# Patient Record
Sex: MALE | Race: WHITE | Employment: OTHER | URBAN - METROPOLITAN AREA
[De-identification: names, ages, dates, MRNs, and addresses within clinical notes are randomized per-mention and may not be internally consistent; named-entity substitution may affect disease eponyms.]

---

## 2017-07-31 PROBLEM — H25.011 CORTICAL AGE-RELATED CATARACT OF RIGHT EYE: Status: ACTIVE | Noted: 2017-07-31

## 2017-08-01 PROBLEM — H25.011 CORTICAL AGE-RELATED CATARACT OF RIGHT EYE: Status: RESOLVED | Noted: 2017-07-31 | Resolved: 2017-08-01

## 2017-08-18 PROBLEM — H25.012 CORTICAL AGE-RELATED CATARACT OF LEFT EYE: Status: ACTIVE | Noted: 2017-08-18

## 2017-08-21 PROBLEM — H25.012 CORTICAL AGE-RELATED CATARACT OF LEFT EYE: Status: RESOLVED | Noted: 2017-08-18 | Resolved: 2017-08-21

## 2019-01-19 PROBLEM — H26.492 POSTERIOR CAPSULAR OPACIFICATION VISUALLY SIGNIFICANT, LEFT EYE: Status: ACTIVE | Noted: 2019-01-19

## 2019-01-21 PROBLEM — H26.492 POSTERIOR CAPSULAR OPACIFICATION VISUALLY SIGNIFICANT, LEFT EYE: Status: RESOLVED | Noted: 2019-01-19 | Resolved: 2019-01-21

## 2019-03-26 ENCOUNTER — HOSPITAL ENCOUNTER (OUTPATIENT)
Dept: LAB | Age: 69
Discharge: HOME | End: 2019-03-26
Payer: MEDICARE

## 2019-03-26 ENCOUNTER — HOSPITAL ENCOUNTER (OUTPATIENT)
Dept: GENERAL RADIOLOGY | Age: 69
Discharge: HOME | End: 2019-03-26
Payer: MEDICARE

## 2019-03-26 ENCOUNTER — HOSPITAL ENCOUNTER (OUTPATIENT)
Dept: PREADMISSION TESTING | Age: 69
Discharge: HOME | End: 2019-03-26
Payer: MEDICARE

## 2019-03-26 DIAGNOSIS — M48.061 SPINAL STENOSIS, LUMBAR REGION, WITHOUT NEUROGENIC CLAUDICATION: ICD-10-CM

## 2019-03-26 LAB
ALBUMIN SERPL-MCNC: 3.9 G/DL (ref 3.4–5)
ALBUMIN/GLOB SERPL: 1.5 {RATIO} (ref 0.8–1.7)
ALP SERPL-CCNC: 117 U/L (ref 45–117)
ALT SERPL-CCNC: 28 U/L (ref 16–61)
ANION GAP SERPL CALC-SCNC: 5 MMOL/L (ref 3–18)
AST SERPL-CCNC: 24 U/L (ref 15–37)
ATRIAL RATE: 59 BPM
BILIRUB SERPL-MCNC: 0.9 MG/DL (ref 0.2–1)
BUN SERPL-MCNC: 20 MG/DL (ref 7–18)
BUN/CREAT SERPL: 17 (ref 12–20)
CALCIUM SERPL-MCNC: 8.3 MG/DL (ref 8.5–10.1)
CALCULATED P AXIS, ECG09: 64 DEGREES
CALCULATED R AXIS, ECG10: 15 DEGREES
CALCULATED T AXIS, ECG11: 50 DEGREES
CHLORIDE SERPL-SCNC: 108 MMOL/L (ref 100–108)
CO2 SERPL-SCNC: 28 MMOL/L (ref 21–32)
CREAT SERPL-MCNC: 1.18 MG/DL (ref 0.6–1.3)
DIAGNOSIS, 93000: NORMAL
ERYTHROCYTE [DISTWIDTH] IN BLOOD BY AUTOMATED COUNT: 13.1 % (ref 11.6–14.5)
GLOBULIN SER CALC-MCNC: 2.6 G/DL (ref 2–4)
GLUCOSE SERPL-MCNC: 93 MG/DL (ref 74–99)
HCT VFR BLD AUTO: 42 % (ref 36–48)
HGB BLD-MCNC: 14.1 G/DL (ref 13–16)
MCH RBC QN AUTO: 31.4 PG (ref 24–34)
MCHC RBC AUTO-ENTMCNC: 33.6 G/DL (ref 31–37)
MCV RBC AUTO: 93.5 FL (ref 74–97)
P-R INTERVAL, ECG05: 134 MS
PLATELET # BLD AUTO: 125 K/UL (ref 135–420)
PMV BLD AUTO: 10.6 FL (ref 9.2–11.8)
POTASSIUM SERPL-SCNC: 4.4 MMOL/L (ref 3.5–5.5)
PROT SERPL-MCNC: 6.5 G/DL (ref 6.4–8.2)
Q-T INTERVAL, ECG07: 416 MS
QRS DURATION, ECG06: 86 MS
QTC CALCULATION (BEZET), ECG08: 411 MS
RBC # BLD AUTO: 4.49 M/UL (ref 4.7–5.5)
SODIUM SERPL-SCNC: 141 MMOL/L (ref 136–145)
VENTRICULAR RATE, ECG03: 59 BPM
WBC # BLD AUTO: 5.9 K/UL (ref 4.6–13.2)

## 2019-03-26 PROCEDURE — 85027 COMPLETE CBC AUTOMATED: CPT

## 2019-03-26 PROCEDURE — 71046 X-RAY EXAM CHEST 2 VIEWS: CPT

## 2019-03-26 PROCEDURE — 93005 ELECTROCARDIOGRAM TRACING: CPT

## 2019-03-26 PROCEDURE — 36415 COLL VENOUS BLD VENIPUNCTURE: CPT

## 2019-03-26 PROCEDURE — 80053 COMPREHEN METABOLIC PANEL: CPT

## 2019-03-26 RX ORDER — NITROGLYCERIN 0.4 MG/1
0.4 TABLET SUBLINGUAL
COMMUNITY
Start: 2016-07-27 | End: 2021-07-12

## 2019-03-26 RX ORDER — GABAPENTIN 300 MG/1
300 CAPSULE ORAL 3 TIMES DAILY
COMMUNITY
Start: 2018-12-27 | End: 2021-04-20

## 2019-03-26 NOTE — PERIOP NOTES
PAT - SURGICAL PRE-ADMISSION INSTRUCTIONS    NAME:  Hugh Yan                                                          TODAY'S DATE:  3/26/2019    SURGERY DATE:  4/17/2019                                  SURGERY ARRIVAL TIME:   TBV on 03/16/2019 between hours of 3:00 and 5:00 pm    1. Do NOT eat or drink anything, including candy or gum, after MIDNIGHT on 04/16/2019 , unless you have specific instructions from your Surgeon or Anesthesia Provider to do so. 2. No smoking on the day of surgery. 3. No alcohol 24 hours prior to the day of surgery. 4. No recreational drugs for one week prior to the day of surgery. 5. Leave all valuables, including money/purse, at home. 6. Remove all jewelry, nail polish, makeup (including mascara); no lotions, powders, deodorant, or perfume/cologne/after shave. 7. Glasses/Contact lenses and Dentures may be worn to the hospital.  They will be removed prior to surgery. 8. Call your doctor if symptoms of a cold or illness develop within 24 ours prior to surgery. 9. AN ADULT MUST DRIVE YOU HOME AFTER OUTPATIENT SURGERY. 10. If you are having an OUTPATIENT procedure, please make arrangements for a responsible adult to be with you for 24 hours after your surgery. 11. If you are admitted to the hospital, you will be assigned to a bed after surgery is complete. Normally a family member will not be able to see you until you are in your assigned bed. 15. Family is encouraged to accompany you to the hospital.  We ask visitors in the treatment area to be limited to ONE person at a time to ensure patient privacy. EXCEPTIONS WILL BE MADE AS NEEDED. 15. Children under 12 are discouraged from entering the treatment area and need to be supervised by an adult when in the waiting room. Special Instructions:    STOP anticoagulants AT LEAST 1 WEEK PRIOR to your surgery or, follow other MD instructions:  No NSAIDS.   Patient states he was instructed to keep taking his Aspirin,    Patient Prep:    use CHG solution    These surgical instructions were reviewed with Carolina Campbell during the PAT visit. A printed copy of the instructions was provided to patient. Directions: On the morning of surgery, please go to the [facility]. Enter the building from the [facility] entrance, 1st floor (next to the Emergency Room entrance). Take the elevator to the 2nd floor. Sign in at the Registration Desk.     If you have any questions and/or concerns, please do not hesitate to call:  (Prior to the day of surgery)  hospitals unit:  [phone]  (Day of surgery)  [facility] unit:  [phone]

## 2019-03-28 NOTE — PROGRESS NOTES
Attended ortho pre-op teaching class. Patient made aware of Care-manager's role in the hospital with transition of care/discharge planning to include Home, Home with Home Health, SNF and any needed DME to include MD order, referral process, FOC, and insurance authorization if required. Patient acknowledged and understanding and had no questions.     Quinton Snell RN    Outcomes Manager  [phone]  [phone]

## 2019-04-16 ENCOUNTER — ANESTHESIA EVENT (OUTPATIENT)
Dept: SURGERY | Age: 69
DRG: 460 | End: 2019-04-16
Payer: MEDICARE

## 2019-04-17 ENCOUNTER — APPOINTMENT (OUTPATIENT)
Dept: GENERAL RADIOLOGY | Age: 69
DRG: 460 | End: 2019-04-17
Attending: ORTHOPAEDIC SURGERY
Payer: MEDICARE

## 2019-04-17 ENCOUNTER — ANESTHESIA (OUTPATIENT)
Dept: SURGERY | Age: 69
DRG: 460 | End: 2019-04-17
Payer: MEDICARE

## 2019-04-17 ENCOUNTER — HOSPITAL ENCOUNTER (INPATIENT)
Age: 69
LOS: 3 days | Discharge: HOME | DRG: 460 | End: 2019-04-20
Attending: ORTHOPAEDIC SURGERY | Admitting: ORTHOPAEDIC SURGERY
Payer: MEDICARE

## 2019-04-17 PROBLEM — M48.061 LUMBAR STENOSIS: Status: ACTIVE | Noted: 2019-04-17

## 2019-04-17 LAB
ABO + RH BLD: NORMAL
BLOOD GROUP ANTIBODIES SERPL: NORMAL
SPECIMEN EXP DATE BLD: NORMAL

## 2019-04-17 PROCEDURE — 77030034850: Performed by: ORTHOPAEDIC SURGERY

## 2019-04-17 PROCEDURE — 77030031139 HC SUT VCRL2 J&J -A: Performed by: ORTHOPAEDIC SURGERY

## 2019-04-17 PROCEDURE — 77030018673: Performed by: ORTHOPAEDIC SURGERY

## 2019-04-17 PROCEDURE — 74011250636 HC RX REV CODE- 250/636

## 2019-04-17 PROCEDURE — C1762 CONN TISS, HUMAN(INC FASCIA): HCPCS | Performed by: ORTHOPAEDIC SURGERY

## 2019-04-17 PROCEDURE — 76210000006 HC OR PH I REC 0.5 TO 1 HR: Performed by: ORTHOPAEDIC SURGERY

## 2019-04-17 PROCEDURE — 77030020269 HC MISC IMPL: Performed by: ORTHOPAEDIC SURGERY

## 2019-04-17 PROCEDURE — 74011250636 HC RX REV CODE- 250/636: Performed by: NURSE ANESTHETIST, CERTIFIED REGISTERED

## 2019-04-17 PROCEDURE — 74011000258 HC RX REV CODE- 258

## 2019-04-17 PROCEDURE — 77030008683 HC TU ET CUF COVD -A: Performed by: ANESTHESIOLOGY

## 2019-04-17 PROCEDURE — C1713 ANCHOR/SCREW BN/BN,TIS/BN: HCPCS | Performed by: ORTHOPAEDIC SURGERY

## 2019-04-17 PROCEDURE — 77030034696 HC CATH URETH FOL 2W BARD -A: Performed by: ORTHOPAEDIC SURGERY

## 2019-04-17 PROCEDURE — 77030040413: Performed by: ORTHOPAEDIC SURGERY

## 2019-04-17 PROCEDURE — 01NB0ZZ RELEASE LUMBAR NERVE, OPEN APPROACH: ICD-10-PCS | Performed by: ORTHOPAEDIC SURGERY

## 2019-04-17 PROCEDURE — 74011250637 HC RX REV CODE- 250/637: Performed by: NURSE ANESTHETIST, CERTIFIED REGISTERED

## 2019-04-17 PROCEDURE — 77030014647 HC SEAL FBRN TISSL BAXT -D: Performed by: ORTHOPAEDIC SURGERY

## 2019-04-17 PROCEDURE — 65270000029 HC RM PRIVATE

## 2019-04-17 PROCEDURE — 77030013079 HC BLNKT BAIR HGGR 3M -A: Performed by: ANESTHESIOLOGY

## 2019-04-17 PROCEDURE — 77030031359 HC BLD BN MILL DISP STRY -E: Performed by: ORTHOPAEDIC SURGERY

## 2019-04-17 PROCEDURE — 74011000272 HC RX REV CODE- 272: Performed by: ORTHOPAEDIC SURGERY

## 2019-04-17 PROCEDURE — 74011250636 HC RX REV CODE- 250/636: Performed by: ORTHOPAEDIC SURGERY

## 2019-04-17 PROCEDURE — 77030040356 HC CORD BPLR FRCP COVD -A: Performed by: ORTHOPAEDIC SURGERY

## 2019-04-17 PROCEDURE — 72100 X-RAY EXAM L-S SPINE 2/3 VWS: CPT

## 2019-04-17 PROCEDURE — 77030032490 HC SLV COMPR SCD KNE COVD -B: Performed by: ORTHOPAEDIC SURGERY

## 2019-04-17 PROCEDURE — 76010000178 HC OR TIME 5.5 TO 6 HR INTENSV-TIER 1: Performed by: ORTHOPAEDIC SURGERY

## 2019-04-17 PROCEDURE — 77030018723 HC ELCTRD BLD COVD -A: Performed by: ORTHOPAEDIC SURGERY

## 2019-04-17 PROCEDURE — 77030037723 HC GRFT BN SUB BGNX -F: Performed by: ORTHOPAEDIC SURGERY

## 2019-04-17 PROCEDURE — 77030004391 HC BUR FLUT MEDT -C: Performed by: ORTHOPAEDIC SURGERY

## 2019-04-17 PROCEDURE — 74011250637 HC RX REV CODE- 250/637: Performed by: ORTHOPAEDIC SURGERY

## 2019-04-17 PROCEDURE — 74011000250 HC RX REV CODE- 250

## 2019-04-17 PROCEDURE — 0SG30K1 FUSION OF LUMBOSACRAL JOINT WITH NONAUTOLOGOUS TISSUE SUBSTITUTE, POSTERIOR APPROACH, POSTERIOR COLUMN, OPEN APPROACH: ICD-10-PCS | Performed by: ORTHOPAEDIC SURGERY

## 2019-04-17 PROCEDURE — 77030018836 HC SOL IRR NACL ICUM -A: Performed by: ORTHOPAEDIC SURGERY

## 2019-04-17 PROCEDURE — 0QH204Z INSERTION OF INTERNAL FIXATION DEVICE INTO RIGHT PELVIC BONE, OPEN APPROACH: ICD-10-PCS | Performed by: ORTHOPAEDIC SURGERY

## 2019-04-17 PROCEDURE — 0SG10K1 FUSION OF 2 OR MORE LUMBAR VERTEBRAL JOINTS WITH NONAUTOLOGOUS TISSUE SUBSTITUTE, POSTERIOR APPROACH, POSTERIOR COLUMN, OPEN APPROACH: ICD-10-PCS | Performed by: ORTHOPAEDIC SURGERY

## 2019-04-17 PROCEDURE — 77030003028 HC SUT VCRL J&J -A: Performed by: ORTHOPAEDIC SURGERY

## 2019-04-17 PROCEDURE — 0QH304Z INSERTION OF INTERNAL FIXATION DEVICE INTO LEFT PELVIC BONE, OPEN APPROACH: ICD-10-PCS | Performed by: ORTHOPAEDIC SURGERY

## 2019-04-17 PROCEDURE — 77010033678 HC OXYGEN DAILY

## 2019-04-17 PROCEDURE — 86900 BLOOD TYPING SEROLOGIC ABO: CPT

## 2019-04-17 PROCEDURE — 74011250637 HC RX REV CODE- 250/637: Performed by: INTERNAL MEDICINE

## 2019-04-17 PROCEDURE — 77030008477 HC STYL SATN SLP COVD -A: Performed by: ANESTHESIOLOGY

## 2019-04-17 PROCEDURE — 76060000042 HC ANESTHESIA 5.5 TO 6 HR: Performed by: ORTHOPAEDIC SURGERY

## 2019-04-17 DEVICE — SCREW POLYAX ASMBLY 8.5MMX80MM: Type: IMPLANTABLE DEVICE | Site: BACK | Status: FUNCTIONAL

## 2019-04-17 DEVICE — IMPLANTABLE DEVICE: Type: IMPLANTABLE DEVICE | Site: BACK | Status: FUNCTIONAL

## 2019-04-17 DEVICE — SCREW SPNL L40MM OD6.5MM POLYAX ASMBLY CANAVERAL: Type: IMPLANTABLE DEVICE | Site: BACK | Status: FUNCTIONAL

## 2019-04-17 DEVICE — GRAFT BNE 6ML SUB OSTEOSPAN MOLD OPN BRL SYR MORPHEUS: Type: IMPLANTABLE DEVICE | Site: BACK | Status: FUNCTIONAL

## 2019-04-17 DEVICE — SCREW POLYAX ASMBLY 6.5MMX45MM: Type: IMPLANTABLE DEVICE | Site: BACK | Status: FUNCTIONAL

## 2019-04-17 DEVICE — SCREW SPNL DIA5.5MM OPN TULIP LOK RELINE: Type: IMPLANTABLE DEVICE | Site: BACK | Status: FUNCTIONAL

## 2019-04-17 DEVICE — SCREW SPNL L50MM OD65MM POLYAX ASSEMB CANAVERAL: Type: IMPLANTABLE DEVICE | Site: BACK | Status: FUNCTIONAL

## 2019-04-17 DEVICE — SCREW SPNL POST THORACOLUMBOSACRAL LCK CLOSE TULIP RELINE: Type: IMPLANTABLE DEVICE | Site: BACK | Status: FUNCTIONAL

## 2019-04-17 DEVICE — GRAFT BNE SUB 30CC 1 4MM CORT CANC CHIP STRATOFUSE: Type: IMPLANTABLE DEVICE | Site: BACK | Status: FUNCTIONAL

## 2019-04-17 RX ORDER — FENTANYL CITRATE 50 UG/ML
50 INJECTION, SOLUTION INTRAMUSCULAR; INTRAVENOUS AS NEEDED
Status: DISCONTINUED | OUTPATIENT
Start: 2019-04-17 | End: 2019-04-17 | Stop reason: HOSPADM

## 2019-04-17 RX ORDER — LIDOCAINE HYDROCHLORIDE 20 MG/ML
INJECTION, SOLUTION EPIDURAL; INFILTRATION; INTRACAUDAL; PERINEURAL AS NEEDED
Status: DISCONTINUED | OUTPATIENT
Start: 2019-04-17 | End: 2019-04-17 | Stop reason: HOSPADM

## 2019-04-17 RX ORDER — OXYCODONE AND ACETAMINOPHEN 10; 325 MG/1; MG/1
1-2 TABLET ORAL
Status: DISCONTINUED | OUTPATIENT
Start: 2019-04-17 | End: 2019-04-20 | Stop reason: HOSPADM

## 2019-04-17 RX ORDER — PANTOPRAZOLE SODIUM 20 MG/1
20 TABLET, DELAYED RELEASE ORAL
Status: DISCONTINUED | OUTPATIENT
Start: 2019-04-18 | End: 2019-04-20 | Stop reason: HOSPADM

## 2019-04-17 RX ORDER — HYDROCODONE BITARTRATE AND ACETAMINOPHEN 5; 325 MG/1; MG/1
1 TABLET ORAL ONCE
Status: DISCONTINUED | OUTPATIENT
Start: 2019-04-17 | End: 2019-04-17 | Stop reason: HOSPADM

## 2019-04-17 RX ORDER — PROPOFOL 10 MG/ML
INJECTION, EMULSION INTRAVENOUS AS NEEDED
Status: DISCONTINUED | OUTPATIENT
Start: 2019-04-17 | End: 2019-04-17 | Stop reason: HOSPADM

## 2019-04-17 RX ORDER — FAMOTIDINE 20 MG/1
20 TABLET, FILM COATED ORAL ONCE
Status: COMPLETED | OUTPATIENT
Start: 2019-04-17 | End: 2019-04-17

## 2019-04-17 RX ORDER — TRAMADOL HYDROCHLORIDE 50 MG/1
50 TABLET ORAL
Status: DISCONTINUED | OUTPATIENT
Start: 2019-04-17 | End: 2019-04-17

## 2019-04-17 RX ORDER — FENTANYL CITRATE 50 UG/ML
INJECTION, SOLUTION INTRAMUSCULAR; INTRAVENOUS AS NEEDED
Status: DISCONTINUED | OUTPATIENT
Start: 2019-04-17 | End: 2019-04-17 | Stop reason: HOSPADM

## 2019-04-17 RX ORDER — GLYCOPYRROLATE 0.2 MG/ML
INJECTION INTRAMUSCULAR; INTRAVENOUS AS NEEDED
Status: DISCONTINUED | OUTPATIENT
Start: 2019-04-17 | End: 2019-04-17 | Stop reason: HOSPADM

## 2019-04-17 RX ORDER — ADHESIVE BANDAGE
30 BANDAGE TOPICAL DAILY PRN
Status: DISCONTINUED | OUTPATIENT
Start: 2019-04-17 | End: 2019-04-20 | Stop reason: HOSPADM

## 2019-04-17 RX ORDER — CEFAZOLIN SODIUM 2 G/50ML
2 SOLUTION INTRAVENOUS
Status: COMPLETED | OUTPATIENT
Start: 2019-04-17 | End: 2019-04-17

## 2019-04-17 RX ORDER — SODIUM CHLORIDE 0.9 % (FLUSH) 0.9 %
5-40 SYRINGE (ML) INJECTION EVERY 8 HOURS
Status: DISCONTINUED | OUTPATIENT
Start: 2019-04-17 | End: 2019-04-20 | Stop reason: HOSPADM

## 2019-04-17 RX ORDER — ACETAMINOPHEN 325 MG/1
650 TABLET ORAL 2 TIMES DAILY
Status: DISCONTINUED | OUTPATIENT
Start: 2019-04-17 | End: 2019-04-20 | Stop reason: HOSPADM

## 2019-04-17 RX ORDER — GUAIFENESIN 100 MG/5ML
162 LIQUID (ML) ORAL DAILY
Status: DISCONTINUED | OUTPATIENT
Start: 2019-04-18 | End: 2019-04-20 | Stop reason: HOSPADM

## 2019-04-17 RX ORDER — MIDAZOLAM HYDROCHLORIDE 1 MG/ML
INJECTION, SOLUTION INTRAMUSCULAR; INTRAVENOUS AS NEEDED
Status: DISCONTINUED | OUTPATIENT
Start: 2019-04-17 | End: 2019-04-17 | Stop reason: HOSPADM

## 2019-04-17 RX ORDER — HYDROMORPHONE HYDROCHLORIDE 1 MG/ML
2 INJECTION, SOLUTION INTRAMUSCULAR; INTRAVENOUS; SUBCUTANEOUS
Status: DISCONTINUED | OUTPATIENT
Start: 2019-04-17 | End: 2019-04-17

## 2019-04-17 RX ORDER — ATENOLOL 25 MG/1
12.5 TABLET ORAL DAILY
Status: DISCONTINUED | OUTPATIENT
Start: 2019-04-18 | End: 2019-04-20 | Stop reason: HOSPADM

## 2019-04-17 RX ORDER — HYDROMORPHONE HYDROCHLORIDE 2 MG/ML
1 INJECTION, SOLUTION INTRAMUSCULAR; INTRAVENOUS; SUBCUTANEOUS
Status: DISCONTINUED | OUTPATIENT
Start: 2019-04-17 | End: 2019-04-17 | Stop reason: SDUPTHER

## 2019-04-17 RX ORDER — SODIUM CHLORIDE, SODIUM LACTATE, POTASSIUM CHLORIDE, CALCIUM CHLORIDE 600; 310; 30; 20 MG/100ML; MG/100ML; MG/100ML; MG/100ML
25 INJECTION, SOLUTION INTRAVENOUS CONTINUOUS
Status: DISPENSED | OUTPATIENT
Start: 2019-04-17 | End: 2019-04-18

## 2019-04-17 RX ORDER — VANCOMYCIN HYDROCHLORIDE 1 G/20ML
INJECTION, POWDER, LYOPHILIZED, FOR SOLUTION INTRAVENOUS AS NEEDED
Status: DISCONTINUED | OUTPATIENT
Start: 2019-04-17 | End: 2019-04-17 | Stop reason: HOSPADM

## 2019-04-17 RX ORDER — CEFAZOLIN SODIUM 2 G/50ML
2 SOLUTION INTRAVENOUS EVERY 8 HOURS
Status: COMPLETED | OUTPATIENT
Start: 2019-04-17 | End: 2019-04-18

## 2019-04-17 RX ORDER — ONDANSETRON 2 MG/ML
INJECTION INTRAMUSCULAR; INTRAVENOUS AS NEEDED
Status: DISCONTINUED | OUTPATIENT
Start: 2019-04-17 | End: 2019-04-17 | Stop reason: HOSPADM

## 2019-04-17 RX ORDER — DEXAMETHASONE SODIUM PHOSPHATE 4 MG/ML
INJECTION, SOLUTION INTRA-ARTICULAR; INTRALESIONAL; INTRAMUSCULAR; INTRAVENOUS; SOFT TISSUE AS NEEDED
Status: DISCONTINUED | OUTPATIENT
Start: 2019-04-17 | End: 2019-04-17 | Stop reason: HOSPADM

## 2019-04-17 RX ORDER — AMLODIPINE BESYLATE 2.5 MG/1
2.5 TABLET ORAL DAILY
Status: DISCONTINUED | OUTPATIENT
Start: 2019-04-18 | End: 2019-04-20 | Stop reason: HOSPADM

## 2019-04-17 RX ORDER — DEXTROSE, SODIUM CHLORIDE, AND POTASSIUM CHLORIDE 5; .45; .15 G/100ML; G/100ML; G/100ML
75 INJECTION INTRAVENOUS CONTINUOUS
Status: DISCONTINUED | OUTPATIENT
Start: 2019-04-17 | End: 2019-04-19

## 2019-04-17 RX ORDER — VECURONIUM BROMIDE FOR INJECTION 1 MG/ML
INJECTION, POWDER, LYOPHILIZED, FOR SOLUTION INTRAVENOUS AS NEEDED
Status: DISCONTINUED | OUTPATIENT
Start: 2019-04-17 | End: 2019-04-17 | Stop reason: HOSPADM

## 2019-04-17 RX ORDER — GABAPENTIN 300 MG/1
300 CAPSULE ORAL 2 TIMES DAILY
Status: DISCONTINUED | OUTPATIENT
Start: 2019-04-17 | End: 2019-04-20 | Stop reason: HOSPADM

## 2019-04-17 RX ORDER — DIPHENHYDRAMINE HYDROCHLORIDE 50 MG/ML
25 INJECTION, SOLUTION INTRAMUSCULAR; INTRAVENOUS
Status: DISCONTINUED | OUTPATIENT
Start: 2019-04-17 | End: 2019-04-17 | Stop reason: HOSPADM

## 2019-04-17 RX ORDER — SODIUM CHLORIDE, SODIUM LACTATE, POTASSIUM CHLORIDE, CALCIUM CHLORIDE 600; 310; 30; 20 MG/100ML; MG/100ML; MG/100ML; MG/100ML
75 INJECTION, SOLUTION INTRAVENOUS CONTINUOUS
Status: DISCONTINUED | OUTPATIENT
Start: 2019-04-17 | End: 2019-04-17 | Stop reason: HOSPADM

## 2019-04-17 RX ORDER — HYDROMORPHONE HYDROCHLORIDE 1 MG/ML
1-2 INJECTION, SOLUTION INTRAMUSCULAR; INTRAVENOUS; SUBCUTANEOUS
Status: DISCONTINUED | OUTPATIENT
Start: 2019-04-17 | End: 2019-04-20 | Stop reason: HOSPADM

## 2019-04-17 RX ORDER — HYDROMORPHONE HYDROCHLORIDE 2 MG/ML
0.5 INJECTION, SOLUTION INTRAMUSCULAR; INTRAVENOUS; SUBCUTANEOUS
Status: DISCONTINUED | OUTPATIENT
Start: 2019-04-17 | End: 2019-04-17 | Stop reason: HOSPADM

## 2019-04-17 RX ORDER — HYDROMORPHONE HYDROCHLORIDE 1 MG/ML
1 INJECTION, SOLUTION INTRAMUSCULAR; INTRAVENOUS; SUBCUTANEOUS
Status: DISCONTINUED | OUTPATIENT
Start: 2019-04-17 | End: 2019-04-17

## 2019-04-17 RX ORDER — LEVOTHYROXINE SODIUM 25 UG/1
25 TABLET ORAL
Status: DISCONTINUED | OUTPATIENT
Start: 2019-04-18 | End: 2019-04-20 | Stop reason: HOSPADM

## 2019-04-17 RX ORDER — DIPHENHYDRAMINE HYDROCHLORIDE 50 MG/ML
25 INJECTION, SOLUTION INTRAMUSCULAR; INTRAVENOUS
Status: DISCONTINUED | OUTPATIENT
Start: 2019-04-17 | End: 2019-04-20 | Stop reason: HOSPADM

## 2019-04-17 RX ORDER — ATORVASTATIN CALCIUM 40 MG/1
40 TABLET, FILM COATED ORAL EVERY EVENING
Status: DISCONTINUED | OUTPATIENT
Start: 2019-04-17 | End: 2019-04-20 | Stop reason: HOSPADM

## 2019-04-17 RX ORDER — OXYCODONE AND ACETAMINOPHEN 5; 325 MG/1; MG/1
1-2 TABLET ORAL
Status: DISCONTINUED | OUTPATIENT
Start: 2019-04-17 | End: 2019-04-17

## 2019-04-17 RX ORDER — EPHEDRINE SULFATE 50 MG/ML
INJECTION, SOLUTION INTRAVENOUS AS NEEDED
Status: DISCONTINUED | OUTPATIENT
Start: 2019-04-17 | End: 2019-04-17 | Stop reason: HOSPADM

## 2019-04-17 RX ORDER — HYDROMORPHONE HYDROCHLORIDE 1 MG/ML
INJECTION, SOLUTION INTRAMUSCULAR; INTRAVENOUS; SUBCUTANEOUS AS NEEDED
Status: DISCONTINUED | OUTPATIENT
Start: 2019-04-17 | End: 2019-04-17 | Stop reason: HOSPADM

## 2019-04-17 RX ORDER — ONDANSETRON 2 MG/ML
4 INJECTION INTRAMUSCULAR; INTRAVENOUS
Status: DISCONTINUED | OUTPATIENT
Start: 2019-04-17 | End: 2019-04-20 | Stop reason: HOSPADM

## 2019-04-17 RX ORDER — SODIUM CHLORIDE 0.9 % (FLUSH) 0.9 %
5-40 SYRINGE (ML) INJECTION AS NEEDED
Status: DISCONTINUED | OUTPATIENT
Start: 2019-04-17 | End: 2019-04-20 | Stop reason: HOSPADM

## 2019-04-17 RX ADMIN — SODIUM CHLORIDE, SODIUM LACTATE, POTASSIUM CHLORIDE, AND CALCIUM CHLORIDE: 600; 310; 30; 20 INJECTION, SOLUTION INTRAVENOUS at 12:41

## 2019-04-17 RX ADMIN — VECURONIUM BROMIDE FOR INJECTION 2 MG: 1 INJECTION, POWDER, LYOPHILIZED, FOR SOLUTION INTRAVENOUS at 09:36

## 2019-04-17 RX ADMIN — VECURONIUM BROMIDE FOR INJECTION 2 MG: 1 INJECTION, POWDER, LYOPHILIZED, FOR SOLUTION INTRAVENOUS at 11:53

## 2019-04-17 RX ADMIN — VECURONIUM BROMIDE FOR INJECTION 8 MG: 1 INJECTION, POWDER, LYOPHILIZED, FOR SOLUTION INTRAVENOUS at 07:45

## 2019-04-17 RX ADMIN — GABAPENTIN 300 MG: 300 CAPSULE ORAL at 18:06

## 2019-04-17 RX ADMIN — OXYCODONE AND ACETAMINOPHEN 1 TABLET: 10; 325 TABLET ORAL at 21:04

## 2019-04-17 RX ADMIN — OXYCODONE AND ACETAMINOPHEN 1 TABLET: 10; 325 TABLET ORAL at 16:40

## 2019-04-17 RX ADMIN — FENTANYL CITRATE 50 MCG: 50 INJECTION, SOLUTION INTRAMUSCULAR; INTRAVENOUS at 13:35

## 2019-04-17 RX ADMIN — CEFAZOLIN SODIUM 2 G: 2 SOLUTION INTRAVENOUS at 08:17

## 2019-04-17 RX ADMIN — FAMOTIDINE 20 MG: 20 TABLET ORAL at 06:42

## 2019-04-17 RX ADMIN — VECURONIUM BROMIDE FOR INJECTION 2 MG: 1 INJECTION, POWDER, LYOPHILIZED, FOR SOLUTION INTRAVENOUS at 08:36

## 2019-04-17 RX ADMIN — GLYCOPYRROLATE 0.2 MG: 0.2 INJECTION INTRAMUSCULAR; INTRAVENOUS at 09:03

## 2019-04-17 RX ADMIN — DEXAMETHASONE SODIUM PHOSPHATE 8 MG: 4 INJECTION, SOLUTION INTRA-ARTICULAR; INTRALESIONAL; INTRAMUSCULAR; INTRAVENOUS; SOFT TISSUE at 07:52

## 2019-04-17 RX ADMIN — Medication 10 ML: at 22:00

## 2019-04-17 RX ADMIN — CEFAZOLIN 2 G: 10 INJECTION, POWDER, FOR SOLUTION INTRAVENOUS at 21:04

## 2019-04-17 RX ADMIN — PROPOFOL 80 MG: 10 INJECTION, EMULSION INTRAVENOUS at 08:35

## 2019-04-17 RX ADMIN — SODIUM CHLORIDE, SODIUM LACTATE, POTASSIUM CHLORIDE, AND CALCIUM CHLORIDE 25 ML/HR: 600; 310; 30; 20 INJECTION, SOLUTION INTRAVENOUS at 07:11

## 2019-04-17 RX ADMIN — FENTANYL CITRATE 100 MCG: 50 INJECTION, SOLUTION INTRAMUSCULAR; INTRAVENOUS at 07:45

## 2019-04-17 RX ADMIN — CEFAZOLIN SODIUM 2 G: 2 SOLUTION INTRAVENOUS at 11:59

## 2019-04-17 RX ADMIN — MIDAZOLAM HYDROCHLORIDE 2 MG: 1 INJECTION, SOLUTION INTRAMUSCULAR; INTRAVENOUS at 07:38

## 2019-04-17 RX ADMIN — VECURONIUM BROMIDE FOR INJECTION 2 MG: 1 INJECTION, POWDER, LYOPHILIZED, FOR SOLUTION INTRAVENOUS at 10:50

## 2019-04-17 RX ADMIN — FENTANYL CITRATE 100 MCG: 50 INJECTION, SOLUTION INTRAMUSCULAR; INTRAVENOUS at 08:35

## 2019-04-17 RX ADMIN — LIDOCAINE HYDROCHLORIDE 100 MG: 20 INJECTION, SOLUTION EPIDURAL; INFILTRATION; INTRACAUDAL; PERINEURAL at 07:45

## 2019-04-17 RX ADMIN — ACETAMINOPHEN 650 MG: 325 TABLET, FILM COATED ORAL at 18:05

## 2019-04-17 RX ADMIN — ATORVASTATIN CALCIUM 40 MG: 40 TABLET, FILM COATED ORAL at 18:06

## 2019-04-17 RX ADMIN — FENTANYL CITRATE 50 MCG: 50 INJECTION, SOLUTION INTRAMUSCULAR; INTRAVENOUS at 13:45

## 2019-04-17 RX ADMIN — DEXTROSE MONOHYDRATE, SODIUM CHLORIDE, AND POTASSIUM CHLORIDE 125 ML/HR: 50; 4.5; 1.49 INJECTION, SOLUTION INTRAVENOUS at 21:05

## 2019-04-17 RX ADMIN — SODIUM CHLORIDE, SODIUM LACTATE, POTASSIUM CHLORIDE, AND CALCIUM CHLORIDE: 600; 310; 30; 20 INJECTION, SOLUTION INTRAVENOUS at 08:59

## 2019-04-17 RX ADMIN — DEXTROSE MONOHYDRATE, SODIUM CHLORIDE, AND POTASSIUM CHLORIDE 125 ML/HR: 50; 4.5; 1.49 INJECTION, SOLUTION INTRAVENOUS at 14:41

## 2019-04-17 RX ADMIN — GLYCOPYRROLATE 0.2 MG: 0.2 INJECTION INTRAMUSCULAR; INTRAVENOUS at 09:04

## 2019-04-17 RX ADMIN — PROPOFOL 120 MG: 10 INJECTION, EMULSION INTRAVENOUS at 07:45

## 2019-04-17 RX ADMIN — EPHEDRINE SULFATE 10 MG: 50 INJECTION, SOLUTION INTRAVENOUS at 09:06

## 2019-04-17 RX ADMIN — ONDANSETRON 4 MG: 2 INJECTION INTRAMUSCULAR; INTRAVENOUS at 12:38

## 2019-04-17 RX ADMIN — HYDROMORPHONE HYDROCHLORIDE 1 MG: 1 INJECTION, SOLUTION INTRAMUSCULAR; INTRAVENOUS; SUBCUTANEOUS at 12:04

## 2019-04-17 RX ADMIN — HYDROMORPHONE HYDROCHLORIDE 1 MG: 1 INJECTION, SOLUTION INTRAMUSCULAR; INTRAVENOUS; SUBCUTANEOUS at 14:57

## 2019-04-17 NOTE — PROGRESS NOTES
conducted an initial consultation and Spiritual Assessment for Juan Manuel Burroughs, who is a 77 y.o.,male. Patients Primary Language is: [place]. According to the patients EMR Zoroastrianism Affiliation is: No preference. The reason the Patient came to the hospital is:   Patient Active Problem List    Diagnosis Date Noted    Lumbar stenosis 04/17/2019    Acute transmural inferior wall MI ([address] 05/04/2014    HTN (hypertension) 05/04/2014        The  provided the following Interventions:  Initiated a relationship of care and support. Provided information about Spiritual Care Services. Offered prayer and assurance of continued prayers on patient's behalf. The following outcomes were achieved:  Patient expressed gratitude for 's visit. Assessment:  There are no further spiritual or Christianity issues which require intervention at this time. Plan:  Chaplains will continue to follow and will provide pastoral care on an as needed/requested basis. Miri Purcell M.Div.   , [facility]: [phone]/CK[facility]: [phone]

## 2019-04-17 NOTE — PROGRESS NOTES
1530- Received verbal report from [facility]. Pt clear on 2L O2 NC. AOx4, Dressing to back CDI. 1- Dr. Calos Desir rounding on pt. Received verbal order for Percocet 10-325mg 1-2 tabs PRN Q4.     1550- Dr. Edie Bazan rounding on pt. Received verbal order for Dilaudid 1-2mg IV Q3 PRN. 2030- Bedside and Verbal shift change report given to Lily Myrick RN (oncoming nurse) by Zuleyma Craven RN (offgoing nurse). Report included the following information SBAR, Kardex and MAR.

## 2019-04-17 NOTE — PERIOP NOTES
patient transferred to 2219 in good condition wife updated on status Dr Mounika Reid also at bedside evaluating patient

## 2019-04-17 NOTE — PROGRESS NOTES
@ 7667  Ortho round complete with coordinator at bedside. Resting comfortably in bed in no acute distress. Plan of care discussed, all questions answered. Safety measures remain in place, call bell in reach.

## 2019-04-17 NOTE — PROGRESS NOTES
Pacheco Wells has decided with their surgeon to have a spine surgery to improve mobility and decrease pain. Spine Surgery Pre-Operative class was attended 03/28/2019. Topics discussed included surgery preparation, what to expect the day of surgery, medications (to include a multimodal approach to pain control and limiting narcotics), nutrition, glycemic control, respiratory therapy, physical and occupational therapy, and discharge planning. Discussed the importance of using these alternative pain management methods with the goal of using less opioid use after surgery and at home. A patient education notebook was provided and the opportunity was given to ask questions. The phone number of the Orthopaedic  was provided for any future questions or concerns.

## 2019-04-17 NOTE — ROUTINE PROCESS
TRANSFER - IN REPORT:    Verbal report received from graham(name) on [place]  being received from pacu(unit) for routine post - op      Report consisted of patients Situation, Background, Assessment and   Recommendations(SBAR). Information from the following report(s) SBAR was reviewed with the receiving nurse. Opportunity for questions and clarification was provided.

## 2019-04-17 NOTE — OP NOTES
BRIEF OPERATIVE NOTE    Date of Procedure: 4/17/2019     Preoperative Diagnosis: stenosis  m48.061    Postoperative Diagnosis: stenosis  m48.061      Procedure: Procedure(s):  decompression left l3-s1, flospine, posterolateral spine fusion l2-s1, iliac bolts, possible lumbar jumper rods    Surgeon(s) and Role: Melly Israel MD - Primary    Anesthesia: General     Findings: degeneration L2-3 with retrolisthesis L2-3,  Stenosis Left L3-s1     Estimated Blood Loss: 350  [id number]      [id number]        [id number]    Specimens: * No specimens in log *     Tubes/Drains: None    Needle/sponge count:  Correct    Complications: 0  In rr pt notes decreased l lep.   Quad, at,gs intact Sherrie Dearth Dr Alline Claude to see  Up at mahsa  PT ambulate with tlso  Home 2-3 days with tlso and ultram  rto 1 month

## 2019-04-17 NOTE — PERIOP NOTES
Pre-Op Summary    Pt arrived via car with family/friend and is oriented to time, place, person and situation. Patient with steady gait with none assistive devices. Visit Vitals  /89 (BP 1 Location: Right arm, BP Patient Position: At rest)   Pulse (!) 58   Temp 96.9 °F (36.1 °C)   Resp 16   Wt 70.8 kg (156 lb)   SpO2 98%   BMI 25.18 kg/m²       Peripheral IV located on Right hand . Patients belongings are located given to wife. Patient's point of contact is Deborajosephine Barroso and their contact number is: [phone]. They will be in the waiting room. They are able to receive medication information. They will be admitted.

## 2019-04-17 NOTE — PERIOP NOTES
TRANSFER - OUT REPORT:    Verbal report given to juan batres(name) on Ambrocio Yang  being transferred to 2219(unit) for routine post - op       Report consisted of patients Situation, Background, Assessment and   Recommendations(SBAR). Information from the following report(s) SBAR, OR Summary, Intake/Output and MAR was reviewed with the receiving nurse. Lines:   Peripheral IV 04/17/19 Right Hand (Active)   Site Assessment Clean, dry, & intact 4/17/2019  7:09 AM   Phlebitis Assessment 0 4/17/2019  7:09 AM   Infiltration Assessment 0 4/17/2019  7:09 AM   Dressing Status Clean, dry, & intact 4/17/2019  7:09 AM   Dressing Type Transparent;Tape 4/17/2019  7:09 AM   Hub Color/Line Status Pink; Infusing 4/17/2019  7:09 AM   Alcohol Cap Used Yes 4/17/2019  7:09 AM        Opportunity for questions and clarification was provided.       Patient transported with:   Registered Nurse

## 2019-04-17 NOTE — CONSULTS
Reason for consultation:    Monitoring and management of  acute and chronic medical problems     Postoperative Diagnosis: stenosis  m48.061       Procedure: Procedure(s):  decompression left L3-S1, flospine, posterolateral spine fusion L2-S1          HPI:Pleasant gentleman post uneventful L/S  decompression and fusion as outlined above. C/O post op pain; no LE numbness or weakness. No CP SOB  No N/V  Zavala in place [address] [facility] PROBLEMS/PMH/PSH    Patient Active Problem List   Diagnosis Code    Acute transmural inferior wall MI ([facility] [address] I21.19    HTN (hypertension) I10    Lumbar stenosis M48.061       PMH:  Past Medical History:   Diagnosis Date    CAD (coronary artery disease)     Cortical age-related cataract of left eye 8/18/2017    Cortical age-related cataract of right eye 7/31/2017    GERD (gastroesophageal reflux disease)     Hypercholesteremia     Hypertension     Posterior capsular opacification visually significant, left eye 1/19/2019    Thyroid disease        PSH:  Past Surgical History:   Procedure Laterality Date    CARDIAC SURG PROCEDURE UNLIST      2 stents May 2014    HX CATARACT REMOVAL Right 08/01/2017    HX CATARACT REMOVAL Left     HX KNEE ARTHROSCOPY Left     HX ORTHOPAEDIC Right     knee replacement    HX SHOULDER ARTHROSCOPY Left     2/2016      ARTHROSCOPY KNEE 15 YEARS AGO    BACK SURGERY 2010   lower back    CORONARY STENT PLACEMENT   2 stents in OM with 50% LAD disease    ENDO, EGD WITH ESOPHAGEAL DILATATION   X2--LAST DILATATION 2005    KNEE REPLACEMENT JAN. 2009   RIGHT TKR    KNEE REPLACEMENT Left 1/4/2018   Procedure: ARTHROPLASTY KNEE REPLACEMENT; Surgeon: Mahi Mazariegos MD    SHOULDER REPAIR Right 2009     PTA MEDICATIONS  Medications Prior to Admission   Medication Sig    gabapentin (NEURONTIN) 300 mg capsule 300 mg three (3) times daily.  levothyroxine (SYNTHROID) 50 mcg tablet Take  by mouth Daily (before breakfast).     amLODIPine (NORVASC) 2.5 mg tablet Take 2.5 mg by mouth daily.  atenolol (TENORMIN) 25 mg tablet Take 1 Tab by mouth daily (with dinner). (Patient taking differently: Take 12.5 mg by mouth daily (with dinner). )    aspirin 81 mg chewable tablet Take 2 Tabs by mouth daily.  atorvastatin (LIPITOR) 40 mg tablet Take 1 Tab by mouth every evening.  omeprazole (PRILOSEC OTC) 20 mg tablet Take 20 mg by mouth Daily (before dinner).  multivitamin (ONE A DAY) tablet Take 1 Tab by mouth daily (with dinner).  nitroglycerin (NITROSTAT) 0.4 mg SL tablet 0.4 mg by SubLINGual route. ALLERGIES:  Not allergic to hydrocodone or  Oxycodone- decrease  Appetite  Discussed with patient agrees to take percocet     Allergies   Allergen Reactions    Hydrocodone Other (comments)     Weakness, GI issues    Oxycodone Other (comments)     GI intolerance and fatigue    Sulfa (Sulfonamide Antibiotics) Rash        Family History   Problem Relation Age of Onset    Heart Disease Mother        Social History     Socioeconomic History    Marital status:      Spouse name: Not on file    Number of children: Not on file    Years of education: Not on file    Highest education level: Not on file   Occupational History    Not on file   Social Needs    Financial resource strain: Not on file    Food insecurity:     Worry: Not on file     Inability: Not on file    Transportation needs:     Medical: Not on file     Non-medical: Not on file   Tobacco Use    Smoking status: Former Smoker     Last attempt to quit: 3/26/1994     Years since quittin.0    Smokeless tobacco: Never Used   Substance and Sexual Activity    Alcohol use:  Yes     Alcohol/week: 7.0 oz     Types: 14 Shots of liquor per week     Comment: socially    Drug use: No    Sexual activity: Not on file   Lifestyle    Physical activity:     Days per week: Not on file     Minutes per session: Not on file    Stress: Not on file   Relationships    Social connections:     Talks on phone: Not on file     Gets together: Not on file     Attends Yazdanism service: Not on file     Active member of club or organization: Not on file     Attends meetings of clubs or organizations: Not on file     Relationship status: Not on file    Intimate partner violence:     Fear of current or ex partner: Not on file     Emotionally abused: Not on file     Physically abused: Not on file     Forced sexual activity: Not on file   Other Topics Concern    Not on file   Social History Narrative    Not on file        ROS:  Constitutional:  . No headache. Eyes: No visual changes  Ears:  No hearing loss. Nose: No bleed. No congestion  Neck: No pain. Cardiac: No CP. SIERRA. No orthopnea. No PND. No leg edema   Respiratory: No cough . No wheezing . No hemoptysis  GI: No nausea . No vomiting. No reflux. No dysphagia. No abdominal pain. Normal BM . No black stool. No blood in the stool  : No dysuria, urgency or frequency. Nocturia 1/night. No sense of incomplete emptying  M/S: + back pain  Neuro:No LE weakness or numbness  Skin: No rash. No itching  HEM/LYMPH:  No bruising. Endocrine: No polydipsia. Psych: No anxiety . No depression. No sleep difficulty       Physical Exam:      Visit Vitals  /80   Pulse 74   Temp 97.5 °F (36.4 °C)   Resp 16   Wt 70.8 kg (156 lb)   SpO2 90%   BMI 25.18 kg/m²     GENERAL: WN NAD  HEENT:    Face:Symmetrical  CONJUNCTIVA: Pink. SCLERA: Anicteric    ORAL MUCOSA: Moist. No lesions  TONGUE: Moist. No lesions  TEETH: Good dentition. No Extractions . No broken or loose tooth    GUMS: No bleeding or swelling  HARD SOFT/ PALATE/TONSILS: No Swelling. No ulcers. No Masses  OROPHARYNX: WNL  NECK: No JVD. No masses. No enlarged-tender lymph nodes.  Trachea in mid line.  THYROID: Size normal. No thyroid nodules    CAROTID ARTERIES: Pulsation 2+ bilaterally. No bruits  LUNGS: CTA. BS Normal Bilaterally  HEART: RRR   Normal S1 S2. No Significant murmur.  No S3 S4  ABDOMEN: Soft. Normal BS. No tenderness. No HSM /SMG. No palpable enlarged aorta. No bruits. LE: No edema. SCD in place  PULSES: Radial 2+; Femoral 2+; Posterior Tibialis 2+  SKIN:No rash. No ecchymosis. No petechiae    MUSCULOSKELETAL:      Adequate ROM in all extremeties    NEUROLOGIC:     Motor  RUE: 5/5  LUE: 5/5  RLE: AT GS 5/5  LLE: At GS 5/5     Reflexes:  Not testesd     PSYCHIATRIC    Mood: normal  Affect: appropriate                   CARDIAC CATHETERIZATION 06/03/14  IMPRESSION:  1. Coronaries:  Moderate non-obstructive calcific coronary artery   disease of major epicardial vessels, with distal vessel disease   of small inferior branch of obtuse marginal 1 and small inferior   branch of diagonal 2. 2/2 stent sites are widely patent  2. Left ventricle:  Normal function, ejection fraction 60%  3. No mitral regurgitation or aortic stenosis     ECHO 05/02/18  NORMAL LEFT VENTRICULAR CAVITY SIZE AND SYSTOLIC FUNCTION WITH A CALCULATED EJECTION  FRACTION OF 55%. STAGE I DIASTOLIC DYSFUNCTION. MILD BASAL SEPTAL HYPERTROPHY WITH NO OUTFLOW TRACT OBSTRUCTION. LEFT ATRIAL DILATATION. THE RIGHT VENTRICLE IS NORMAL IN SIZE AND FUNCTION. NORMAL PULMONARY ARTERY PRESSURE OF 26 MMHG. NO HEMODYNAMICALLY SIGNIFICANT VALVULAR PATHOLOGY.     Labs Reviewed:    Recent Results (from the past 24 hour(s))   TYPE & SCREEN    Collection Time: 04/17/19  7:00 AM   Result Value Ref Range    Crossmatch Expiration 04/20/2019     ABO/Rh(D) B POSITIVE     Antibody screen NEG      Imaging Reviewed:    ASSESSMENT  Post L/S surgery stable  Post op Pain  HTN   CAD   Preserved LVEF  Mild diastolic dysfunction    PATIENT AGREES TO TAKE PERCOCET    PLAN  Resume PTA meds as condition allows  Percocet  D/C Zavala in AM        Lawrence Montanez MD  4/17/2019, 2:37 PM

## 2019-04-17 NOTE — ANESTHESIA PREPROCEDURE EVALUATION
Relevant Problems No relevant active problems Anesthetic History No history of anesthetic complications Review of Systems / Medical History Patient summary reviewed and pertinent labs reviewed Pulmonary Within defined limits Neuro/Psych Within defined limits Cardiovascular Hypertension CAD and cardiac stents Exercise tolerance: >4 METS 
  
GI/Hepatic/Renal 
Within defined limits Endo/Other Hypothyroidism: well controlled Other Findings Comments:  
 
 
  
 
 
 
 
Physical Exam 
 
Airway Mallampati: II 
TM Distance: 4 - 6 cm Neck ROM: normal range of motion Mouth opening: Normal 
 
 Cardiovascular Regular rate and rhythm,  S1 and S2 normal,  no murmur, click, rub, or gallop Rhythm: regular Rate: normal 
 
 
 
 Dental 
No notable dental hx Pulmonary Breath sounds clear to auscultation Abdominal 
GI exam deferred Other Findings Anesthetic Plan ASA: 3 Anesthesia type: general 
 
 
 
 
Induction: Intravenous Anesthetic plan and risks discussed with: Patient

## 2019-04-18 PROCEDURE — 65270000029 HC RM PRIVATE

## 2019-04-18 PROCEDURE — 74011250637 HC RX REV CODE- 250/637: Performed by: INTERNAL MEDICINE

## 2019-04-18 PROCEDURE — 97162 PT EVAL MOD COMPLEX 30 MIN: CPT

## 2019-04-18 PROCEDURE — 97116 GAIT TRAINING THERAPY: CPT

## 2019-04-18 PROCEDURE — 97535 SELF CARE MNGMENT TRAINING: CPT

## 2019-04-18 PROCEDURE — 74011250636 HC RX REV CODE- 250/636: Performed by: ORTHOPAEDIC SURGERY

## 2019-04-18 PROCEDURE — 74011250637 HC RX REV CODE- 250/637: Performed by: ORTHOPAEDIC SURGERY

## 2019-04-18 PROCEDURE — 97165 OT EVAL LOW COMPLEX 30 MIN: CPT

## 2019-04-18 RX ADMIN — PANTOPRAZOLE SODIUM 20 MG: 20 TABLET, DELAYED RELEASE ORAL at 07:57

## 2019-04-18 RX ADMIN — ASPIRIN 81 MG CHEWABLE TABLET 162 MG: 81 TABLET CHEWABLE at 08:37

## 2019-04-18 RX ADMIN — GABAPENTIN 300 MG: 300 CAPSULE ORAL at 09:00

## 2019-04-18 RX ADMIN — DEXTROSE MONOHYDRATE, SODIUM CHLORIDE, AND POTASSIUM CHLORIDE 125 ML/HR: 50; 4.5; 1.49 INJECTION, SOLUTION INTRAVENOUS at 18:56

## 2019-04-18 RX ADMIN — LEVOTHYROXINE SODIUM 25 MCG: 25 TABLET ORAL at 07:57

## 2019-04-18 RX ADMIN — OXYCODONE AND ACETAMINOPHEN 1 TABLET: 10; 325 TABLET ORAL at 18:52

## 2019-04-18 RX ADMIN — ATORVASTATIN CALCIUM 40 MG: 40 TABLET, FILM COATED ORAL at 18:52

## 2019-04-18 RX ADMIN — ACETAMINOPHEN 650 MG: 325 TABLET, FILM COATED ORAL at 08:37

## 2019-04-18 RX ADMIN — CEFAZOLIN 2 G: 10 INJECTION, POWDER, FOR SOLUTION INTRAVENOUS at 06:15

## 2019-04-18 RX ADMIN — GABAPENTIN 300 MG: 300 CAPSULE ORAL at 18:52

## 2019-04-18 RX ADMIN — Medication 10 ML: at 23:09

## 2019-04-18 RX ADMIN — ACETAMINOPHEN 650 MG: 325 TABLET, FILM COATED ORAL at 18:52

## 2019-04-18 RX ADMIN — Medication 10 ML: at 15:34

## 2019-04-18 RX ADMIN — OXYCODONE AND ACETAMINOPHEN 1 TABLET: 10; 325 TABLET ORAL at 06:16

## 2019-04-18 RX ADMIN — CEFAZOLIN 2 G: 10 INJECTION, POWDER, FOR SOLUTION INTRAVENOUS at 12:37

## 2019-04-18 RX ADMIN — OXYCODONE AND ACETAMINOPHEN 1 TABLET: 10; 325 TABLET ORAL at 10:33

## 2019-04-18 RX ADMIN — Medication 10 ML: at 06:21

## 2019-04-18 NOTE — PROGRESS NOTES
Reason for Admission:   Lumbar stenosis                  RRAT Score: 14                 Do you (patient/family) have any concerns for transition/discharge? no                  Plan for utilizing home health:   CM to follow and assess    Current Advanced Directive/Advance Care Plan: On file    Likelihood of readmission? Mod/yellow              Transition of Care Plan:   Interviewed pt and verified face sheet information. Pt states he lives with his wife in a one story home with 4 steps to enter. Denies difficulty with stairs. Pt reports that at baseline he is independent with [employer] and uses no DME. He does drive and states he is able to obtain needed meds through [employer] or from [employer]. He states his wife Piyush Demarco will provide transportation at discharge. Pt states surgeon informed him he will make decision regarding home health later today and that he (pt) has already spoken with [email or web address] home health. Denies any concerns regarding plan to discharge home when medically ready. Patient has designated ____wife____________________ to participate in his/her discharge plan and to receive any needed information. Name: Piyush Demarco  Address:  Phone number: [phone]    [address] Provider list has been given to the patient and/or patient representative. Patient and/or patient representative has signed the [place] [employer] selecting __[facility]_______________________as their preference agency and a copy given. Both Home Health Provider list and [employer] of Choice have been placed on the chart.

## 2019-04-18 NOTE — PROGRESS NOTES
Progress Note      Post Operative Day: 1    Assessment:    1. Status post  LAMINEC/FACETECT/FORAMIN,LUMBAR [15894] (SPINE LUMBAR POSTERIOR INTERBODY FUSION (PLIF))  LAMINEC/FACETECT/FORAMIN,EACH ADDNL [74263]  INSERT VERT FIX DEV,POST,3-6 SGMTS [21083]  CT ARTHRODESIS POSTERIOR/POSTEROLATERAL LUMBAR [36379]  SPINE FUSN,POST TECH,EA ADDNL SGMT [48518]  INSERT PELVIC FIXATION DEVICE [15235]  CT ALLOGRAFT FOR SPINE SURGERY ONLY STRUCTURAL [33768] for Lumbar stenosis [M48.061] 4/17/2019,   Progressing. PLAN:    1. Mobilize. Continue P.T.  2. Brace  3. Discharge Planning likely home tomorrow            HPI: Jose Carlos Osullivan is a 77 y.o. male patient without new complaints status post fusion for Lumbar stenosis [M48.061] 4/17/2019. No new orthopaedic changes. Blood pressure 95/71, pulse 78, temperature 98.7 °F (37.1 °C), resp. rate 18, weight 70.8 kg (156 lb), SpO2 98 %. CBC w/Diff   Lab Results   Component Value Date/Time    WBC 5.9 03/26/2019 10:15 AM    RBC 4.49 (L) 03/26/2019 10:15 AM    HCT 42.0 03/26/2019 10:15 AM          Physical Assessment:  General: in no apparent distress   Extremities:  Neurovascular intact    Dressing:  Dry   DVT Exam:   No exam evidence to suggest DVT.  Compartments soft and NT.               - Dilcia Locke PA-C  4/18/2019  Office [phone]  Cell [phone]

## 2019-04-18 NOTE — ANESTHESIA POSTPROCEDURE EVALUATION
Procedure(s): 
decompression left l3-s1, flospine, posterolateral spine fusion l2-s1, iliac bolts, possible lumbar jumper rods. general 
 
Anesthesia Post Evaluation Multimodal analgesia: multimodal analgesia used between 6 hours prior to anesthesia start to PACU discharge Patient location during evaluation: bedside Patient participation: complete - patient participated Level of consciousness: awake Pain management: adequate Airway patency: patent Anesthetic complications: no 
Cardiovascular status: stable Respiratory status: acceptable Hydration status: acceptable Post anesthesia nausea and vomiting:  controlled Vitals Value Taken Time /73 4/17/2019  2:01 PM  
Temp 36.4 °C (97.6 °F) 4/17/2019  2:01 PM  
Pulse 68 4/17/2019  2:14 PM  
Resp 11 4/17/2019  2:14 PM  
SpO2 92 % 4/17/2019  2:14 PM  
Vitals shown include unvalidated device data.

## 2019-04-18 NOTE — PROGRESS NOTES
6143: PT orders received and chart reviewed. Per patient up in chair earlier this AM with night staff. Remained in chair for breakfast and just returned to bed 20 minutes ago. Request to rest prior to PT d/t being out of bed earlier. Will follow up.      Thank you,     Kenton Saint, PT, DPT  Office Extension: [phone]  Pager: [phone]

## 2019-04-18 NOTE — PROGRESS NOTES
@ 0930  Ortho rounds complete with coordinator at bedside. Stated OOB to chair this early morning for breakfast with assistance from nursing staff, tolerated. Back in bed at this time, comfortable. Noted back brace at bedside. No c/o of pain, no acute distress noted. Plan of care discussed, all questions answered. Encouraged use of ICS per orders. Additional education materials provided, verbalized understanding. . Safety measures remain in place, call bell in reach.

## 2019-04-18 NOTE — PROGRESS NOTES
Problem: Mobility Impaired (Adult and Pediatric)  Goal: *Acute Goals and Plan of Care (Insert Text)  Description  Physical Therapy Goals  Initiated 4/18/2019 and to be accomplished within 7 day(s)  1. Patient will move from supine to sit and sit to supine  in bed with modified independence. 2.  Patient will perform sit to stand with modified independence. 3.  Patient will transfer from bed to chair and chair to bed with modified independence using the least restrictive device. 4.  Patient will ambulate with modified independence for 250 feet with the least restrictive device. 5.  Patient will ascend/descend 4 stairs with handrail(s) with modified independence. 6.  Patient will negotiate obstacles during ambulation with modified independence. 7. Patient will verbalize 3/3 lumbar precautions. 8. Patient will demonstrate compliance with lumbar precaution greater than 90% of session. 9. Patient will demonstrate appropriate use of incentive spirometer to aid in pulomnary compliance for mobility. 4/18/2019 1421 by Gaby Hrady PT  Outcome: Resolved/Met  PHYSICAL THERAPY: DAILY TREATMENT NOTE/DISCHARGE   INPATIENT: Medicare: Hospital Day: 2     Patient: Karen Moyer (77 y.o. male)    Date: 4/18/2019  Primary Diagnosis: Lumbar stenosis [M48.061]   Procedure(s) (LRB):  decompression left l3-s1, flospine, posterolateral spine fusion l2-s1, iliac bolts, possible lumbar jumper rods (Left), 1 Day Post-Op,   Precautions: Fall, Spinal  Chart, physical therapy assessment, plan of care and goals were reviewed. PLOF: Mod I with cane    ASSESSMENT:  Mod I for supine to sit. Complies with lumbar precautions. Verbalizes lumbar precautions. Donned/doffed lumbar brace with mod I. Mod I for sit to stand. Amb 50ft with ww mod I. Completed 5 steps with mod I and additional time. Utilized one handrail on left. Educated on stair negotiation with unilateral weakness; demonstrated understanding.   Amb additional 150ft with ww mod I. Returned to supine in bed with mod I. All needs in reach. Denies mobility concerns for return home. EDUCATION:   Education:  Patient was educated on the following topics: Bed mobility, transfers, ADLs, balance, amb, safety, exercise, role of PT, plan of care, cognition, skin integrity, vitals    Barriers to Learning/Limitations: None  Compensate with: visual, verbal, tactile, kinesthetic cues/model    Progression toward goals:  ?      Goals met  ? Improving appropriately and progressing toward goals  ? Improving slowly and progressing toward goals  ? Not making progress toward goals and plan of care will be adjusted     PLAN:  Patient will be discharged from physical therapy at this time. Rationale for discharge:  ? Goals Achieved  ? Plateau Reached  ? Patient not participating in therapy  ? Other:  Discharge Recommendations:  None  Further Equipment Recommendations for Discharge:  brace/splint and rolling walker     SUBJECTIVE:   Patient stated ? Sounds good. ?    OBJECTIVE DATA SUMMARY:   Critical Behavior:  Neurologic State: Alert  Orientation Level: Oriented X4  Cognition: Follows commands  Safety/Judgement: Fall prevention    Functional Mobility:      Functional Status      Indep   (I)   Mod I   Super-vision   Min A   Mod A   Max A   Total A   Assist x2 Verbal cues Additional time Not tested   Comments   Rolling ?  ?  ? ?    ?    ?  ?  ? ? ? ? Supine to sit ?  ?  ? ?  ?  ?  ?  ? ? ? ? Sit to supine ? ?  ? ?  ?  ?  ?  ? ? ? ? Sit to stand ?  ?  ? ?  ?  ?  ?  ? ? ? ? Stand to sit ?  ?  ? ?  ?  ?  ?  ? ? ? ? Bed to chair transfers ? ?  ? ?  ?  ?  ?  ? ? ? ? Balance    Good   Fair   Poor   Unable   Not tested   Comments   Sitting static ?  ?  ?  ?  ? Sitting dynamic ?  ?  ?  ?  ? Standing static ?  ?  ?  ?  ? Standing dynamic ?  ?  ?  ?  ?           Mobility/Gait:   Level of Assistance: Modified independent  Assistive Device: rolling walker  Distance Ambulated: 50 feet, 150 feet       Stairs:   Level of Assistance: Modified independent  Assistive Device: brace/splint  Rail Use: left  Number of Stairs: 5    Pain:  Pre treatment pain level: 5  Post treatment pain level: 5    Activity Tolerance:   Fair     After treatment:   ?  Patient left in no apparent distress sitting up in chair  ? Patient left in no apparent distress in bed  ? Call bell left within reach  ? Nursing notified  ? Caregiver present  ?   Bed alarm activated    Bryant Carlson PT   Time Calculation: 15 mins

## 2019-04-18 NOTE — PROGRESS NOTES
Received email from [employer], referral for hh has been given to [facility]. foc completed. Notified Taylor Paez.

## 2019-04-18 NOTE — PROGRESS NOTES
Problem: Mobility Impaired (Adult and Pediatric)  Goal: *Acute Goals and Plan of Care (Insert Text)  Description  Physical Therapy Goals  Initiated 4/18/2019 and to be accomplished within 7 day(s)  1. Patient will move from supine to sit and sit to supine  in bed with modified independence. 2.  Patient will perform sit to stand with modified independence. 3.  Patient will transfer from bed to chair and chair to bed with modified independence using the least restrictive device. 4.  Patient will ambulate with modified independence for 250 feet with the least restrictive device. 5.  Patient will ascend/descend 4 stairs with handrail(s) with modified independence. 6.  Patient will negotiate obstacles during ambulation with modified independence. 7. Patient will verbalize 3/3 lumbar precautions. 8. Patient will demonstrate compliance with lumbar precaution greater than 90% of session. 9. Patient will demonstrate appropriate use of incentive spirometer to aid in pulomnary compliance for mobility. Outcome: Progressing Towards Goal   PHYSICAL THERAPY: INITIAL ASSESSMENT   INPATIENT: Medicare: Hospital Day: 2     Patient: Marco Aviles (77 y.o. male)    Date: 4/18/2019  Primary Diagnosis: Lumbar stenosis [M48.061]   Procedure(s) (LRB):  decompression left l3-s1, flospine, posterolateral spine fusion l2-s1, iliac bolts, possible lumbar jumper rods (Left), 1 Day Post-Op   Precautions: Fall, Spinal  PLOF: Mod I with cane    ASSESSMENT :  Educated on lumbar precautions and role of brace; verbalized understanding. Supervision for logroll and supine to sit. Seated EOB with good balance. Supervision to don lumbar brace. Supervision for sit to stand. Amb 200ft with supervision and ww. Returned to room and request to return to bed. Doffed brace independently. Min A for sit to supine transfer. Able to scoot self up in bed with no difficulties. All needs in reach. RICKY Barrera aware.     Patient presents with deficits in:  Bed Mobility, Transfers, Gait, Balance and Stairs    Patient will benefit from skilled intervention to address the above impairments. Patient?s rehabilitation potential is considered to be Good  Factors which may influence rehabilitation potential include:   ? None noted  ? Mental ability/status  ? Medical condition  ? Home/family situation and support systems  ? Safety awareness  ? Pain tolerance/management  ? Other:      PLAN :   Recommendations and Planned Interventions:  ?           Bed Mobility Training             ? Neuromuscular Re-Education  ? Transfer Training                   ? Orthotic/Prosthetic Training  ? Gait Training                          ? Modalities  ? Therapeutic Exercises          ? Edema Management/Control  ? Therapeutic Activities            ? Patient and Family Training/Education  ? Other (comment):      EDUCATION:   Education:  Patient was educated on the following topics: Bed mobility, transfers, ADLs, balance, amb, safety, exercise, role of PT, plan of care, cognition, skin integrity, vitals      Barriers to Learning/Limitations: None  Compensate with: visual, verbal, tactile, kinesthetic cues/model    Recommendations for the next treatment session: stairs  Frequency/Duration: Patient will be followed by physical therapy 1-2 times per day/4-7 days per week to address goals. Discharge Recommendations: None  Further Equipment Recommendations for Discharge: brace/splint and rolling walker     SUBJECTIVE:   Patient stated ? I'll rest til then.?    OBJECTIVE DATA SUMMARY:     Past Medical History:   Diagnosis Date    CAD (coronary artery disease)     Cortical age-related cataract of left eye 8/18/2017    Cortical age-related cataract of right eye 7/31/2017    GERD (gastroesophageal reflux disease)     Hypercholesteremia     Hypertension     Posterior capsular opacification visually significant, left eye 1/19/2019    Thyroid disease      Past Surgical History:   Procedure Laterality Date    CARDIAC SURG PROCEDURE UNLIST      2 stents May 2014    HX CATARACT REMOVAL Right 08/01/2017    HX CATARACT REMOVAL Left     HX KNEE ARTHROSCOPY Left     HX ORTHOPAEDIC Right     knee replacement    HX SHOULDER ARTHROSCOPY Left     2/2016     Eval Complexity: History: MEDIUM  Complexity : 1-2 comorbidities / personal factors will impact the outcome/ POC Exam:MEDIUM Complexity : 3 Standardized tests and measures addressing body structure, function, activity limitation and / or participation in recreation  Presentation: MEDIUM Complexity : Evolving with changing characteristics  Clinical Decision Making:Medium Complexity clinical judgement  Overall Complexity:MEDIUM    Prior Level of Function/Home Situation:   Home Situation  Home Environment: Private residence  # Steps to Enter: 4  Rails to Enter: Yes  One/Two Story Residence: One story  Living Alone: No  Support Systems: Spouse/Significant Other/Partner  Patient Expects to be Discharged to[de-identified] Private residence  Current DME Used/Available at Home: Cane, straight, Brace/Splint  Critical Behavior:  Neurologic State: Alert  Orientation Level: Oriented X4  Cognition: Follows commands  Safety/Judgement: Fall prevention  Psychosocial  Patient Behaviors: Cooperative    Manual Muscle Testing (LE)         R     L    Hip Flexion:   5/5 5/5  Knee EXT:   5/5 5/5  Knee FLEX:   5/5 5/5  Ankle DF:   5/5 5/5  _________________________________________________   Tone : BLE normal  Sensation: Intact to light touch BLE  Range Of Motion: BLE AROM WFL    Functional Mobility:      Functional Status      Indep   (I)   Mod I   Super-vision   Min A   Mod A   Max A   Total A   Assist x2 Verbal cues Additional time Not tested   Comments   Rolling ?  ?  ? ?    ?    ?  ?  ? ? ? ? Supine to sit ?  ?  ? ?  ?  ?  ?  ? ? ? ? Sit to supine ?   ?  ? ?  ?  ?  ?  ? ? ? ?    Sit to stand ?  ?  ? ?  ?  ?  ?  ? ? ? ? Stand to sit ?  ?  ? ?  ?  ?  ?  ? ? ? ? Bed to chair transfers ? ?  ? ?  ?  ?  ?  ? ? ? ? Declines chair       Balance    Good   Fair   Poor   Unable   Not tested   Comments   Sitting static ?  ?  ?  ?  ? Sitting dynamic ?  ?  ?  ?  ? Standing static ?  ?  ?  ?  ? Standing dynamic ?  ?  ?  ?  ? Mobility/Gait:   Level of Assistance: Supervision  Assistive Device: rolling walker  Distance Ambulated: 200 feet       Pain:  Pre treatment pain level: 4  Post treatment pain level: 4    Activity Tolerance:   Good  Please refer to the flowsheet for vital signs taken during this treatment. After treatment:   ?         Patient left in no apparent distress sitting up in chair  ? Patient left in no apparent distress in bed  ? Call bell left within reach  ? Nursing notified  ? Caregiver present  ? Bed alarm activated    COMMUNICATION/EDUCATION:   ?         Fall prevention education was provided and the patient/caregiver indicated understanding. ? Patient/family have participated as able in goal setting and plan of care. ?         Patient/family agree to work toward stated goals and plan of care. ?         Patient understands intent and goals of therapy, but is neutral about his/her participation. ? Patient is unable to participate in goal setting and plan of care.     Thank you for this referral.  Moises Jones, PT   Time Calculation: 25 mins

## 2019-04-18 NOTE — PROGRESS NOTES
Problem: Falls - Risk of  Goal: *Absence of Falls  Description  Document Sunny Murry Fall Risk and appropriate interventions in the flowsheet.   Outcome: Progressing Towards Goal     Problem: Patient Education: Go to Patient Education Activity  Goal: Patient/Family Education  Outcome: Progressing Towards Goal

## 2019-04-18 NOTE — PROGRESS NOTES
Bedside shift change report given by Agustín Lechuga RN (offgoing nurse). Report included the following information SBAR, Kardex. 2030-Patient sitting up in bed, able to readjust shelf. Pain med given for c/o pain. Patient noted to be upset and complaining about not having his regular night time meds ordered. Patient stated that \"I spoke to them (doctors) and they say it was 'fine by me' if I took my meds cause I showed them my list of meds. MD paged, spoke with Dr. Mikaela Kidd who state meds is to start in the morning as schedule. Patient informed of MD's decision. 2200-Pain med given for c/o back pain. Neuro check done, but patient c/o numbness only in right thigh. 0645-Zavala removed. Tolerated well. Patient was assisted out of bed and transferred to recliner. Back brace applied. Pain med given prior to transfer. Tolerated well.

## 2019-04-18 NOTE — PROGRESS NOTES
Problem: Self Care Deficits Care Plan (Adult)  Goal: *Acute Goals and Plan of Care (Insert Text)  Description  Occupational Therapy Goals  Initiated 4/18/2019 within 7 day(s). 1.  Patient will perform grooming tasks at sink with modified independence. 2.  Patient will perform lower body dressing with modified independence. 3.  Patient will perform functional task in standing for 8 minutes with modified independence and fair+ dynamic standing balance in prep for ADLs. 4.  Patient will perform toilet transfers with modified independence. 5.  Patient will perform all aspects of toileting with modified independence. 6.  Patient will utilize energy conservation techniques during functional activities with minimal verbal cues. Outcome: Progressing Towards Goal     [address] THERAPY: INITIAL ASSESSMENT   INPATIENT: Medicare: Hospital Day: 2     Patient: Anastasia Ashraf (6 y.o. male)    Date: 4/18/2019  Primary Diagnosis: Lumbar stenosis [M48.061]   Procedure(s) (LRB):  decompression left l3-s1, flospine, posterolateral spine fusion l2-s1, iliac bolts, possible lumbar jumper rods (Left), 1 Day Post-Op,   Precautions: Falls  PLOF: Pt was independent with ADLs & functional mobility. ASSESSMENT:   Based on the objective data described below, the patient presents with impairments with regard to bed mobility, activity tolerance, and independence in ADLs secondary to lumbar decompression & fusion. Pt supine on arrival, agreeable to therapy. Supervision and additional time for supine-->sit. Fair sitting balance during LB dressing utilizing AE (sock aid, reacher). Pt maneuvered to bathroom with supervision; skilled instruction for RW positioning during toileting, bathing & grooming tasks (see functional levels below). Pt/spouse educated on role of OT, POC and home safety; they verbalized understanding. Recommend 1-2 more visits to address functional goals.  Recommend HH for home safety eval. Supervision to return supine; pt c/o 3/10 pain at end of session. Recommendations for the next treatment session: reinforce ADLs at sink & LB dressing  Mr. Shanel Crow will benefit from skilled intervention to address the above impairments. His rehabilitation potential is considered to be Good. EDUCATION     Education:  Patient was educated on the following topics: role of OT and POC; home safety  Barriers to Learning/Limitations: None  Compensate with: visual, verbal, tactile, kinesthetic cues/model     PLAN OF CARE:   Problems:  Decreased ROM, Decreased strength affecting function, Decreased ADL/functioning of activities, Decreased transfer abilities and Decreased activity tolerance    Recommendations and Planned Interventions:  X                  Self Care Training                 X    Therapeutic Activities  X                 Functional Mobility Training    ? Cognitive Retraining  X                  Therapeutic Exercises           X         Endurance Activities  X                  Balance Training                     ? Neuromuscular Re-ed   ? Visual/Perceptual Training     X       Home Safety Training  X                  Patient Education                 X          Family Training/Education  ? Other (comment):    Frequency/Duration: Patient will be followed by occupational therapy 1-2 more visits to address goals. Discharge Recommendations: Home Health     Further Equipment Recommendations for Discharge: shower chair  SUBJECTIVE:   Patient stated: \"I'll go back to bed. \"    OBJECTIVE/TREATMENT:     Past Medical History:   Diagnosis Date    CAD (coronary artery disease)     Cortical age-related cataract of left eye 8/18/2017    Cortical age-related cataract of right eye 7/31/2017    GERD (gastroesophageal reflux disease)     Hypercholesteremia     Hypertension     Posterior capsular opacification visually significant, left eye 1/19/2019    Thyroid disease      Past Surgical History:   Procedure Laterality Date    CARDIAC SURG PROCEDURE UNLIST      2 stents May 2014    HX CATARACT REMOVAL Right 08/01/2017    HX CATARACT REMOVAL Left     HX KNEE ARTHROSCOPY Left     HX ORTHOPAEDIC Right     knee replacement    HX SHOULDER ARTHROSCOPY Left     2/2016      Eval Complexity: History: LOW Complexity : Brief history review ; Examination: LOW Complexity : 1-3 performance deficits relating to physical, cognitive , or psychosocial skils that result in activity limitations and / or participation restrictions ; Decision Making:LOW Complexity : No comorbidities that affect functional and no verbal or physical assistance needed to complete eval tasks     Prior Level of Function/Home Situation:   Fully active; able to carry on all performance without restriction  [place] Sp[place]  [facility] [place]    Cognitive/Behavioral Status:   Neurologic State: alert   Orientation: oriented to time, place, person and situation  Cognition:   appropriate decision making, appropriate for age attention/concentration, appropriate safety awareness and following commands  follows multi-step simple commands/direction   Safety/Judgement: Awareness of environment and Fall prevention    ROM: within normal limits (BUEs)  MMT: 4+/5 (BUEs)  Coordination: BUEs WFL  Hand dominance:Right    Skin: Intact (BUEs)  Edema: None noted (BUEs)  Sensation: Intact (BUEs)    Vision/Perceptual: normal      Functional Status      Indep   Mod I   Sup. /  Set- Up    SBA   CGA   Min Assist   Mod Assist   Max assist   Total Assist   Assist x2    Additional Time   NT   Comments   Rolling ?  ?  ?  ?  ?    ?    ?    ?  ?  ?  ?  X    Supine to sit ?  ?  ?  ?  ?  ?  ?  ?  ?  ?  ?  X     Sit to supine ? ?  X X ?  ?  ?  ?  ?  ?  ?  ? Sit to stand ? ?  X ?  ?  ?  ?  ?  ?  ?  ?  ? Toilet Transfer ? ?   X ?  ?  ?  ?  ?  ?  ?  ? Feeding X ?  ?  ?  ?  ?  ?  ?  ?  ?  ?  ? Grooming ?   ?  X ?  ?  ?  ?  ?  ? ?  ?  ?     Bathing  ?  ?  ?  ?  ?  X ?  ?  ?  ?  ?  ?     UB Dressing  ? X ?  ?  ?  ?  ?  ?  ?  ?  ?  ?     LB Dressing  ? ?  X ?  ?  ?  ?  ?  ?  ?  ?  ? Toileting ? ?  X  ?  ?  ?  ?  ?  ?  ?  ?  ? Balance    Good   Fair   Poor   Unable   Comments   Sitting static X ?  ?  ? Sitting dynamic ? X ?  ? Standing static ? X  ?  ? Fair+   Standing dynamic ? X ?  ? ADL Intervention:   Supervision/set-up for LB dressing at EOB (don/doff bilateral socks, don elastic waist pants) with fair seated balance and AE utilizing sock aid & reacher. Supervision for oral care & hand hygiene while standing with min vc's for RW placement at sink. Mod I for UB/LB bathing on [facility] for energy conservation and safety. Pain:   Pre treatment:  4/10  Post treatment: 3/10  Scale: numeric     Activity tolerance:  fair    COMMUNICATION/EDUCATION: Pt/spouse educated on role of OT, POC and home safety. They verbalized understanding. ?         Fall prevention education was provided and the patient/caregiver indicated understanding. ? Patient/family have participated as able in goal setting and plan of care. ?         Patient/family agree to work toward stated goals and plan of care. ?         Patient understands intent and goals of therapy, but is neutral about his/her participation     The patients plan of care was also discussed with: Physical Therapist, Certified Occupational Therapy Assistant and Registered Nurse. · After treatment position/precautions:   o Supine in bed  o Call light within reach  o RN notified     Recommendations for nursing:  Written on communication board:   Verbally communicated to:      Thank you for this referral.  Harsh Marinelli MS OTR/L  Time Calculation: 20 mins

## 2019-04-18 NOTE — PROGRESS NOTES
Problem: Discharge Planning  Goal: *Discharge to safe environment  Outcome: Progressing Towards Goal   Plan: home

## 2019-04-18 NOTE — OP NOTES
[facility]  OPERATIVE REPORT    Name:  Clai Wilkes  MR#:   [id number]  :  1950  ACCOUNT #:  [de-identified]  DATE OF SERVICE:  2019    PREOPERATIVE DIAGNOSES:  1. Left stenosis, L3-S1 status post lumbar decompression, L2-L3-S1.  2.  Retrolisthesis, L2-L3. 3.  Spondylosis, L3-S1. POSTOPERATIVE DIAGNOSES:  1. Left stenosis L3-S1 status post lumbar decompression, L2-L3-S1.  2.  Retrolisthesis, L2-L3. 3.  Spondylosis L3-S1. PROCEDURE PERFORMED:  1. Lumbar decompression, left L3-S1 with decompression of the left L3, L4, L5 and S1 nerve roots with foraminotomy and partial fasciectomy. 2.  FloSpine pedicle instrumentation, L2-S1.  3.  Bilateral placement of iliac bolts. 4.  Bilateral posterolateral spinal fusion L2-S1.  5.  Placement of ASF NuVasive jumper romero supplemental fixation, L2-S1. SURGEON:  Genny Guan MD    ASSISTANT:  Herbie. ANESTHESIA:  General.    COMPLICATIONS:  None. SPECIMENS REMOVED:  None. IMPLANTS:  000    ESTIMATED BLOOD LOSS:  350 mL. FINDINGS:  1. The patient had previously undergone lumbar decompression at L2-L3-S1 approximately 9 years ago. He had significant left L3-L4, L4-L5 foraminal stenosis, mild left L5-S1 foraminal stenosis. 2.  Retrolisthesis, L2-L3. 3.  Spondylosis, L3-S1. PROCEDURE:  Under general anesthesia, the patient rolled in prone position on Osmany frame peripheral nerves were padded, back prepped and draped in a sterile fashion. Previous incision was opened, extended proximally to the cartilage of L1. Muscles were subperiosteally exposed right at transverse processes of L2 to L5 and the sacral ala. Radiographs were taken for localization of position. On the left side, margin of previous decompression from the caudal border of L2 to the sacrum sharply delineated with a curette.   Decompression widened to the L3, L4, L5 and S1 pedicles on the left under respective L3, L4, L5 and S1 nerve roots, decompressed around the pedicles into the foramen, performed foraminotomy and partial facetectomy. Because of significant stenosis L3-L4, L4-L5, the entire L3 inferior articular facet and pars as well as the entire L4 inferior articular facet and pars was removed thoroughly decompressing the left L3-L4 and L4-L5 foramen. At the end of decompression, ball probe could be placed around the pedicles into the foramen without nerve root compromise. Pedicle holes were made bilaterally at L2-S1 under direct visual control. Holes drilled with depth gauge, felt to be within bone. Metallic marker was placed. Radiographs obtained were satisfactory. Beginning first on the left hand side, posterolateral elements at L2-S1 decorticated with Midas Hussain drill, followed by placement of bone graft material and pedicle screws. In a similar fashion, right hand side decorticated, bone grafted and instrumented with pedicle screws. Medial pedicle was palpated on the left L3, L4, L5 and S1, no exposed hardware. AP and lateral radiographs obtained were satisfactory. Iliac bolts were then placed bilaterally through separate subfascial incisions with lateral connectors tunneled anterior to the erector spinae muscles. Radiographs taken showed the iliac bolts in satisfactory position. Phil was then placed first on the right hand side again using the iliac connector and S1 screws distally and sequentially introducing it into the more proximal screws to L2. Care was taken to provide satisfactory lumbar lordosis. Set screws were placed. In a similar fashion, the phil was placed on the left hand side. All set screws were tightened and torqued to 128 pounds.     NuVasive ASF jumper phil supplemental with fixation was then placed bilaterally with connectors between S1 and iliac bolt lateral connectors, on the right between the L4 and L5 screw and the L2 and L3 screw, on the left between S1 and lateral iliac bolt connectors between L3 and L4 and L2 and L3. Rods placed bilaterally followed by set screws all which were tightened. Final radiographs obtained were satisfactory. Distal bone graft placed about the rods and then the wound was closed in layers with a powdered vancomycin in deep subcutaneous tissue. Wound dressed. The patient awakened and taken to the recovery room in satisfactory condition without complication. Estimated blood loss 350 mL. The patient received 125 mL cell saver blood, 2000 mL crystalloid, 290 mL urine output. No specimens. No tubes or drains. Needle and sponge count correct without complication. The patient not awakened sufficiently to test motor sensation at the time of dictation.       Amanda Mcghee MD      [id number]  D:  04/17/2019 13:36  T:  04/18/2019 1:30  JOB #:  [id number]  CC:  MD Anna Adhikari MD

## 2019-04-19 PROCEDURE — 74011250636 HC RX REV CODE- 250/636: Performed by: INTERNAL MEDICINE

## 2019-04-19 PROCEDURE — 74011250637 HC RX REV CODE- 250/637: Performed by: ORTHOPAEDIC SURGERY

## 2019-04-19 PROCEDURE — 97535 SELF CARE MNGMENT TRAINING: CPT

## 2019-04-19 PROCEDURE — 65270000029 HC RM PRIVATE

## 2019-04-19 PROCEDURE — 74011250637 HC RX REV CODE- 250/637: Performed by: INTERNAL MEDICINE

## 2019-04-19 RX ADMIN — ACETAMINOPHEN 650 MG: 325 TABLET, FILM COATED ORAL at 17:24

## 2019-04-19 RX ADMIN — Medication 10 ML: at 07:44

## 2019-04-19 RX ADMIN — GABAPENTIN 300 MG: 300 CAPSULE ORAL at 09:35

## 2019-04-19 RX ADMIN — GABAPENTIN 300 MG: 300 CAPSULE ORAL at 17:24

## 2019-04-19 RX ADMIN — DEXTROSE MONOHYDRATE, SODIUM CHLORIDE, AND POTASSIUM CHLORIDE 75 ML/HR: 50; 4.5; 1.49 INJECTION, SOLUTION INTRAVENOUS at 07:44

## 2019-04-19 RX ADMIN — ASPIRIN 81 MG CHEWABLE TABLET 162 MG: 81 TABLET CHEWABLE at 09:43

## 2019-04-19 RX ADMIN — ACETAMINOPHEN 650 MG: 325 TABLET, FILM COATED ORAL at 09:35

## 2019-04-19 RX ADMIN — LEVOTHYROXINE SODIUM 25 MCG: 25 TABLET ORAL at 06:44

## 2019-04-19 RX ADMIN — Medication 10 ML: at 18:56

## 2019-04-19 RX ADMIN — ATENOLOL 12.5 MG: 25 TABLET ORAL at 09:36

## 2019-04-19 RX ADMIN — AMLODIPINE BESYLATE 2.5 MG: 2.5 TABLET ORAL at 09:36

## 2019-04-19 RX ADMIN — ATORVASTATIN CALCIUM 40 MG: 40 TABLET, FILM COATED ORAL at 17:24

## 2019-04-19 RX ADMIN — OXYCODONE AND ACETAMINOPHEN 1 TABLET: 10; 325 TABLET ORAL at 10:48

## 2019-04-19 RX ADMIN — OXYCODONE AND ACETAMINOPHEN 1 TABLET: 10; 325 TABLET ORAL at 04:04

## 2019-04-19 RX ADMIN — OXYCODONE AND ACETAMINOPHEN 1 TABLET: 10; 325 TABLET ORAL at 22:21

## 2019-04-19 RX ADMIN — PANTOPRAZOLE SODIUM 20 MG: 20 TABLET, DELAYED RELEASE ORAL at 06:45

## 2019-04-19 NOTE — PROGRESS NOTES
Problem: Self Care Deficits Care Plan (Adult)  Goal: *Acute Goals and Plan of Care (Insert Text)  Description  Occupational Therapy Goals  Initiated 4/18/2019 within 7 day(s). 1.  Patient will perform grooming tasks at sink with modified independence. 2.  Patient will perform lower body dressing with modified independence. 3.  Patient will perform functional task in standing for 8 minutes with modified independence and fair+ dynamic standing balance in prep for ADLs. 4.  Patient will perform toilet transfers with modified independence. 5.  Patient will perform all aspects of toileting with modified independence. 6.  Patient will utilize energy conservation techniques during functional activities with minimal verbal cues. Outcome: Progressing Towards Goal   OCCUPATIONAL THERAPY TREATMENT/DISCHARGE    Patient: Kaley Pickens (63 y.o. male)  Date: 4/19/2019  Diagnosis: Lumbar stenosis [M48.061] <principal problem not specified>  Procedure(s) (LRB):  decompression left l3-s1, flospine, posterolateral spine fusion l2-s1, iliac bolts, possible lumbar jumper rods (Left) 2 Days Post-Op  Precautions: Fall, Spinal  Chart, occupational therapy assessment, plan of care, and goals were reviewed. ASSESSMENT:  Pt presented in bed with wife at bedside. Pt performs bed mobility with independence to sit at the EOB. Pt performs UB dressing to don orthotic with independence. Pt able to recall 2/3 spinal precautions. Pt requires SBA and RW for sit to stand. Pt performs functional mobility from the bed to chair with SBA and RW. Pt requires SBA for stand to sit in the chair. Pt performs LB dressing with adaptive equipment. Pt and spouse educated on adaptive equipment for LB dressing, energy conservation, explanation of spinal precautions, role of OT, and problem solving with IADLs in preparation for discharge home. Progression toward goals:  ?          Improving appropriately and progressing toward goals  ? Improving slowly and progressing toward goals  ? Not making progress toward goals and plan of care will be adjusted     PLAN:  Patient will be discharged from occupational therapy at this time. Rationale for discharge:  ? Goals Achieved  ? Plateau Reached  ? Patient not participating in therapy  ? Other:  Discharge Recommendations:  Home Health  Further Equipment Recommendations for Discharge:  shower chair       SUBJECTIVE:   Patient stated i'm just worried about the car ride.     OBJECTIVE DATA SUMMARY:   Cognitive/Behavioral Status:  Neurologic State: Alert  Orientation Level: Oriented X4  Cognition: Follows commands  Safety/Judgement: Fall prevention    Functional Mobility and Transfers for ADLs:  Bed Mobility:  Rolling: Independent  Supine to Sit: Independent  Scooting: Independent     Transfers:  Sit to Stand: Stand-by assistance  Bed to Chair: Stand-by assistance    Balance:  Sitting: Intact  Sitting - Static: Good (unsupported)  Sitting - Dynamic: Good (unsupported)  Standing: Intact; With support  Standing - Static: Good  Standing - Dynamic : Good    ADL Intervention:  Upper Body Dressing Assistance  Orthotics(Brace): Independent  Lower Body Dressing Assistance  Socks: Modified independent  Position Performed: Seated in chair  Adaptive Equipment Used: Reacher;Sock aid    Pain:  Pain Scale 1: Numeric (0 - 10)  Pain Intensity 1: 4  Pain Location 1: Back  Pain Orientation 1: Lower  Pain Description 1: Aching  Pain Intervention(s) 1: Medication (see MAR)    Activity Tolerance:    Good    Please refer to the flowsheet for vital signs taken during this treatment. After treatment:   ?  Patient left in no apparent distress sitting up in chair  ? Patient left in no apparent distress in bed  ? Call bell left within reach  ? Nursing notified  ? Spouse present  ?   Bed alarm activated    Christi Null  Time Calculation: 16 mins

## 2019-04-19 NOTE — PROGRESS NOTES
1933: Received patient in bed awake,alert and oriented x4. No signs of distress. Bed low and locked. Call bell within reach. Will continue monitoring. 2343: In bed resting quietly,no other concern at this time,call bell within reach. Will continue monitoring.

## 2019-04-19 NOTE — ROUTINE PROCESS
Bedside and Verbal shift change report given to RICKY Gonzalez (oncoming nurse) by Vitaliy Lundberg (offgoing nurse). Report included the following information SBAR, Kardex, MAR and Recent Results.

## 2019-04-19 NOTE — PROGRESS NOTES
POD 1    POSTOPERATIVE DIAGNOSES:  1. Left stenosis L3-S1 status post lumbar decompression, L2-L3-S1.  2.  Retrolisthesis, L2-L3. 3.  Spondylosis L3-S1.     PROCEDURE PERFORMED:  1. Lumbar decompression, left L3-S1 with decompression of the left L3, L4, L5 and S1 nerve roots with foraminotomy and partial fasciectomy. 2.  FloSpine pedicle instrumentation, L2-S1.  3.  Bilateral placement of iliac bolts. 4.  Bilateral posterolateral spinal fusion L2-S1.  5.  Placement of ASF NuVasive jumper romero supplemental fixation, L2-S1.       HPI:Uneventful last 24 hrs  Post op pain controlled; tolerating percocet  No LEs numbness or weakness  No CP SOB  Bit of nausea; no vomiting  No abdominal pain; passing gas  Zavala out; voiding well  Participating wit PT    [facility] PROBLEMS/PMH/PSH    Patient Active Problem List   Diagnosis Code    Acute transmural inferior wall MI ([facility] [address] I21.19    HTN (hypertension) I10    Lumbar stenosis M48.061       PMH:  Past Medical History:   Diagnosis Date    CAD (coronary artery disease)     Cortical age-related cataract of left eye 8/18/2017    Cortical age-related cataract of right eye 7/31/2017    GERD (gastroesophageal reflux disease)     Hypercholesteremia     Hypertension     Posterior capsular opacification visually significant, left eye 1/19/2019    Thyroid disease        PSH:  Past Surgical History:   Procedure Laterality Date    CARDIAC SURG PROCEDURE UNLIST      2 stents May 2014    HX CATARACT REMOVAL Right 08/01/2017    HX CATARACT REMOVAL Left     HX KNEE ARTHROSCOPY Left     HX ORTHOPAEDIC Right     knee replacement    HX SHOULDER ARTHROSCOPY Left     2/2016      ARTHROSCOPY KNEE 15 YEARS AGO    BACK SURGERY 2010   lower back    CORONARY STENT PLACEMENT   2 stents in OM with 50% LAD disease    ENDO, EGD WITH ESOPHAGEAL DILATATION   X2--LAST DILATATION 2005    KNEE REPLACEMENT JAN. 2009   RIGHT TKR    KNEE REPLACEMENT Left 1/4/2018   Procedure: ARTHROPLASTY KNEE REPLACEMENT; Surgeon: Gato Vigil MD    SHOULDER REPAIR Right 2009     PTA MEDICATIONS  Medications Prior to Admission   Medication Sig    gabapentin (NEURONTIN) 300 mg capsule 300 mg three (3) times daily.  levothyroxine (SYNTHROID) 50 mcg tablet Take  by mouth Daily (before breakfast).  amLODIPine (NORVASC) 2.5 mg tablet Take 2.5 mg by mouth daily.  atenolol (TENORMIN) 25 mg tablet Take 1 Tab by mouth daily (with dinner). (Patient taking differently: Take 12.5 mg by mouth daily (with dinner). )    aspirin 81 mg chewable tablet Take 2 Tabs by mouth daily.  atorvastatin (LIPITOR) 40 mg tablet Take 1 Tab by mouth every evening.  omeprazole (PRILOSEC OTC) 20 mg tablet Take 20 mg by mouth Daily (before dinner).  multivitamin (ONE A DAY) tablet Take 1 Tab by mouth daily (with dinner).  nitroglycerin (NITROSTAT) 0.4 mg SL tablet 0.4 mg by SubLINGual route.      ALLERGIES:  Not allergic to hydrocodone or  Oxycodone- decrease  Appetite  Discussed with patient agrees to take percocet     Allergies   Allergen Reactions    Hydrocodone Other (comments)     Weakness, GI issues    Oxycodone Other (comments)     GI intolerance and fatigue    Sulfa (Sulfonamide Antibiotics) Rash        Family History   Problem Relation Age of Onset    Heart Disease Mother        Social History     Socioeconomic History    Marital status:      Spouse name: Not on file    Number of children: Not on file    Years of education: Not on file    Highest education level: Not on file   Occupational History    Not on file   Social Needs    Financial resource strain: Not on file    Food insecurity:     Worry: Not on file     Inability: Not on file    Transportation needs:     Medical: Not on file     Non-medical: Not on file   Tobacco Use    Smoking status: Former Smoker     Last attempt to quit: 3/26/1994     Years since quittin.0    Smokeless tobacco: Never Used   Substance and Sexual Activity    Alcohol use: Yes     Alcohol/week: 7.0 oz     Types: 14 Shots of liquor per week     Comment: socially    Drug use: No    Sexual activity: Not on file   Lifestyle    Physical activity:     Days per week: Not on file     Minutes per session: Not on file    Stress: Not on file   Relationships    Social connections:     Talks on phone: Not on file     Gets together: Not on file     Attends [employer] service: Not on file     Active member of club or organization: Not on file     Attends meetings of clubs or organizations: Not on file     Relationship status: Not on file    Intimate partner violence:     Fear of current or ex partner: Not on file     Emotionally abused: Not on file     Physically abused: Not on file     Forced sexual activity: Not on file   Other Topics Concern    Not on file   Social History Narrative    Not on file        ROS:  Constitutional:  . No headache. Cardiac: HPI  Respiratory: No cough . No wheezing   GI: HPI  : HPI  M/S: HPI  Neuro: HPI  Skin: No rash. No itching      Physical Exam:      Visit Vitals  /83 (BP 1 Location: Right arm, BP Patient Position: At rest)   Pulse 100   Temp 98.1 °F (36.7 °C)   Resp 18   Wt 70.8 kg (156 lb)   SpO2 93%   BMI 25.18 kg/m²     GENERAL: NAD  LUNGS: CTA. BS Normal Bilaterally  HEART: RRR    Significant murmur  ABDOMEN: Soft. Normal BS. No tenderness. LE: No edema. SCD in place  PULSES: Radial 2+ Posterior Tibialis 2+  Dressing clean and dry per N/S    NEUROLOGIC:     Motor  RLE: AT GS 5/5  LLE: At GS 5/5     CARDIAC CATHETERIZATION 06/03/14  IMPRESSION:  1. Coronaries:  Moderate non-obstructive calcific coronary artery   disease of major epicardial vessels, with distal vessel disease   of small inferior branch of obtuse marginal 1 and small inferior   branch of diagonal 2. 2/2 stent sites are widely patent  2. Left ventricle:  Normal function, ejection fraction 60%  3.  No mitral regurgitation or aortic stenosis     ECHO 05/02/18  NORMAL LEFT VENTRICULAR CAVITY SIZE AND SYSTOLIC FUNCTION WITH A CALCULATED EJECTION  FRACTION OF 55%. STAGE I DIASTOLIC DYSFUNCTION. MILD BASAL SEPTAL HYPERTROPHY WITH NO OUTFLOW TRACT OBSTRUCTION. LEFT ATRIAL DILATATION. THE RIGHT VENTRICLE IS NORMAL IN SIZE AND FUNCTION. NORMAL PULMONARY ARTERY PRESSURE OF 26 MMHG. NO HEMODYNAMICALLY SIGNIFICANT VALVULAR PATHOLOGY. Labs Reviewed:    No results found for this or any previous visit (from the past 24 hour(s)).   Imaging Reviewed:    ASSESSMENT  Post L/S surgery stable  Post op Pain controlled  HTN   CAD   Preserved LVEF  Mild diastolic dysfunction    PLAN  Advance diet  Continue same meds/plan of care      Mane Ambrosio MD  4/18/2019, 2:37 PM

## 2019-04-19 NOTE — PROGRESS NOTES
POD 2    POSTOPERATIVE DIAGNOSES:  1. Left stenosis L3-S1 status post lumbar decompression, L2-L3-S1.  2.  Retrolisthesis, L2-L3. 3.  Spondylosis L3-S1.     PROCEDURE PERFORMED:  1. Lumbar decompression, left L3-S1 with decompression of the left L3, L4, L5 and S1 nerve roots with foraminotomy and partial fasciectomy. 2.  FloSpine pedicle instrumentation, L2-S1.  3.  Bilateral placement of iliac bolts. 4.  Bilateral posterolateral spinal fusion L2-S1.  5.  Placement of ASF NuVasive jumper romero supplemental fixation, L2-S1.       HPI:  Uneventful last 24 hrs  Post op pain controlled  No LEs numbness or weakness  No CP SOB  Tolerating diet; no nausea  No abdominal pain; passing gas  Voiding well      ACTIVE MEDICAL PROBLEMS/PMH/PSH    Patient Active Problem List   Diagnosis Code    Acute transmural inferior wall MI ([address] I21.19    HTN (hypertension) I10    Lumbar stenosis M48.061       PMH:  Past Medical History:   Diagnosis Date    CAD (coronary artery disease)     Cortical age-related cataract of left eye 8/18/2017    Cortical age-related cataract of right eye 7/31/2017    GERD (gastroesophageal reflux disease)     Hypercholesteremia     Hypertension     Posterior capsular opacification visually significant, left eye 1/19/2019    Thyroid disease        PSH:  Past Surgical History:   Procedure Laterality Date    CARDIAC SURG PROCEDURE UNLIST      2 stents May 2014    HX CATARACT REMOVAL Right 08/01/2017    HX CATARACT REMOVAL Left     HX KNEE ARTHROSCOPY Left     HX ORTHOPAEDIC Right     knee replacement    HX SHOULDER ARTHROSCOPY Left     2/2016      ARTHROSCOPY KNEE 15 YEARS AGO    BACK SURGERY 2010   lower back    CORONARY STENT PLACEMENT   2 stents in OM with 50% LAD disease    ENDO, EGD WITH ESOPHAGEAL DILATATION   X2--LAST DILATATION 2005    KNEE REPLACEMENT JAN. 2009   RIGHT TKR    KNEE REPLACEMENT Left 1/4/2018   Procedure: ARTHROPLASTY KNEE REPLACEMENT; Surgeon: Jessi Joseph MD  SHOULDER REPAIR Right 2009     PTA MEDICATIONS  Medications Prior to Admission   Medication Sig    gabapentin (NEURONTIN) 300 mg capsule 300 mg three (3) times daily.  levothyroxine (SYNTHROID) 50 mcg tablet Take  by mouth Daily (before breakfast).  amLODIPine (NORVASC) 2.5 mg tablet Take 2.5 mg by mouth daily.  atenolol (TENORMIN) 25 mg tablet Take 1 Tab by mouth daily (with dinner). (Patient taking differently: Take 12.5 mg by mouth daily (with dinner). )    aspirin 81 mg chewable tablet Take 2 Tabs by mouth daily.  atorvastatin (LIPITOR) 40 mg tablet Take 1 Tab by mouth every evening.  omeprazole (PRILOSEC OTC) 20 mg tablet Take 20 mg by mouth Daily (before dinner).  multivitamin (ONE A DAY) tablet Take 1 Tab by mouth daily (with dinner).  nitroglycerin (NITROSTAT) 0.4 mg SL tablet 0.4 mg by SubLINGual route. ALLERGIES:  Not allergic to hydrocodone or  Oxycodone- decrease  Appetite  Discussed with patient agrees to take percocet     Allergies   Allergen Reactions    Hydrocodone Other (comments)     Weakness, GI issues    Oxycodone Other (comments)     GI intolerance and fatigue    Sulfa (Sulfonamide Antibiotics) Rash        Family History   Problem Relation Age of Onset    Heart Disease Mother        Social History     Socioeconomic History    Marital status:      Spouse name: Not on file    Number of children: Not on file    Years of education: Not on file    Highest education level: Not on file   Occupational History    Not on file   Social Needs    Financial resource strain: Not on file    Food insecurity:     Worry: Not on file     Inability: Not on file    Transportation needs:     Medical: Not on file     Non-medical: Not on file   Tobacco Use    Smoking status: Former Smoker     Last attempt to quit: 3/26/1994     Years since quittin.0    Smokeless tobacco: Never Used   Substance and Sexual Activity    Alcohol use:  Yes     Alcohol/week: 7.0 oz Types: 14 Shots of liquor per week     Comment: socially    Drug use: No    Sexual activity: Not on file   Lifestyle    Physical activity:     Days per week: Not on file     Minutes per session: Not on file    Stress: Not on file   Relationships    Social connections:     Talks on phone: Not on file     Gets together: Not on file     Attends Rastafari service: Not on file     Active member of club or organization: Not on file     Attends meetings of clubs or organizations: Not on file     Relationship status: Not on file    Intimate partner violence:     Fear of current or ex partner: Not on file     Emotionally abused: Not on file     Physically abused: Not on file     Forced sexual activity: Not on file   Other Topics Concern    Not on file   Social History Narrative    Not on file        ROS:  Constitutional:  . No headache. Cardiac: HPI  Respiratory: No cough  GI: HPI  : HPI  M/S: HPI  Neuro: HPI  Skin: No rash. No itching      Physical Exam:      Visit Vitals  BP 96/64 (BP Patient Position: At rest;Supine)   Pulse 80   Temp 98.4 °F (36.9 °C)   Resp 16   Ht 5' 6\" (1.676 m)   Wt 70.8 kg (156 lb)   SpO2 93%   BMI 25.18 kg/m²     GENERAL: NAD  LUNGS: CTA. BS Normal Bilaterally  HEART: RRR    Significant murmur  ABDOMEN: Soft. Normal BS. No tenderness. LE: No edema. SCD in place  PULSES: Radial 2+ Posterior Tibialis 2+    NEUROLOGIC:     Motor  RLE: AT GS 5/5  LLE: At GS 5/5     CARDIAC CATHETERIZATION 06/03/14  IMPRESSION:  1. Coronaries:  Moderate non-obstructive calcific coronary artery   disease of major epicardial vessels, with distal vessel disease   of small inferior branch of obtuse marginal 1 and small inferior   branch of diagonal 2. 2/2 stent sites are widely patent  2. Left ventricle:  Normal function, ejection fraction 60%  3. No mitral regurgitation or aortic stenosis     ECHO 05/02/18  NORMAL LEFT VENTRICULAR CAVITY SIZE AND SYSTOLIC FUNCTION WITH A CALCULATED EJECTION  FRACTION OF 55%. STAGE I DIASTOLIC DYSFUNCTION. MILD BASAL SEPTAL HYPERTROPHY WITH NO OUTFLOW TRACT OBSTRUCTION. LEFT ATRIAL DILATATION. THE RIGHT VENTRICLE IS NORMAL IN SIZE AND FUNCTION. NORMAL PULMONARY ARTERY PRESSURE OF 26 MMHG. NO HEMODYNAMICALLY SIGNIFICANT VALVULAR PATHOLOGY. Labs Reviewed:    No results found for this or any previous visit (from the past 24 hour(s)).   Imaging Reviewed:    ASSESSMENT  Post L/S surgery stable  Post op Pain controlled  HTN   CAD   Preserved LVEF  Mild diastolic dysfunction    PLAN  Continue same meds/plan of care  Home in MD Tammy  4/19/2019, 2:37 PM

## 2019-04-19 NOTE — ROUTINE PROCESS
Bedside shift change report given to [address] [place] [address] (oncoming nurse) by Karen Marino (offgoing nurse). Report included the following information SBAR, Kardex, Procedure Summary and MAR.

## 2019-04-19 NOTE — PROGRESS NOTES
4940  Received bedside report from [facility]. Patient is alert and oriented x4. Moving all extremities. Able to to turn. Patient stated that numbness in right thigh is going away. 1000  Patient wife at bedside, said she helped patient up to the bathroom and to wash up. Discontinued fluids per order by Dr. Goldie Foreman    1500 Patient stated he got up to the bathroom by himself, educated patient that he needed to call just so we can be there in case. 1630  Patient got up to bathroom again by himself. Reeducated about the need to call if he needs to go to the bathroom. 1730 Patient stated he was in pain of a 4, but did not want percocet. Gave tylenol. Helped patient up to the bathroom. Patient back to bed resting comfortably.

## 2019-04-19 NOTE — PROGRESS NOTES
Progress Note      Post Operative Day: 2    Assessment:    1. Status post  LAMINEC/FACETECT/FORAMIN,LUMBAR [09783] (SPINE LUMBAR POSTERIOR INTERBODY FUSION (PLIF))  LAMINEC/FACETECT/FORAMIN,EACH ADDNL [58603]  INSERT VERT FIX DEV,POST,3-6 SGMTS [67631]  [place] ARTHRODESIS POSTERIOR/POSTEROLATERAL LUMBAR [07558]  SPINE FUSN,POST TECH,EA ADDNL SGMT [77527]  INSERT PELVIC FIXATION DEVICE [61397]  [place] ALLOGRAFT FOR SPINE SURGERY ONLY STRUCTURAL [26640] for Lumbar stenosis [M48.061] 4/17/2019,   Progressing. PLAN:    1. Mobilize. Continue P.T.  2. Brace  3. Discharge Planning home tomorrow when pain better controlled           HPI: Arden Caldwell is a 77 y.o. male patient without new complaints status post fusion for Lumbar stenosis [M48.061] 4/17/2019. No new orthopaedic changes. Blood pressure 127/80, pulse 95, temperature 98.4 °F (36.9 °C), resp. rate 16, height 5' 6\" (1.676 m), weight 70.8 kg (156 lb), SpO2 96 %. CBC w/Diff   Lab Results   Component Value Date/Time    WBC 5.9 03/26/2019 10:15 AM    RBC 4.49 (L) 03/26/2019 10:15 AM    HCT 42.0 03/26/2019 10:15 AM          Physical Assessment:  General: in no apparent distress   Extremities:  Neurovascular intact    Dressing:  Dry   DVT Exam:   No exam evidence to suggest DVT. Compartments soft and NT.               - Dilcia Locke PA-C  4/19/2019  Office [phone]  Cell [phone]    Will dc IV.   Home in am with tlso and ultram  rto 1 month

## 2019-04-20 VITALS
TEMPERATURE: 98.5 F | HEART RATE: 84 BPM | BODY MASS INDEX: 25.07 KG/M2 | WEIGHT: 156 LBS | OXYGEN SATURATION: 95 % | DIASTOLIC BLOOD PRESSURE: 68 MMHG | SYSTOLIC BLOOD PRESSURE: 117 MMHG | RESPIRATION RATE: 17 BRPM | HEIGHT: 66 IN

## 2019-04-20 PROCEDURE — 74011250637 HC RX REV CODE- 250/637: Performed by: INTERNAL MEDICINE

## 2019-04-20 PROCEDURE — 74011250637 HC RX REV CODE- 250/637: Performed by: ORTHOPAEDIC SURGERY

## 2019-04-20 RX ADMIN — GABAPENTIN 300 MG: 300 CAPSULE ORAL at 09:06

## 2019-04-20 RX ADMIN — LEVOTHYROXINE SODIUM 25 MCG: 25 TABLET ORAL at 06:21

## 2019-04-20 RX ADMIN — AMLODIPINE BESYLATE 2.5 MG: 2.5 TABLET ORAL at 09:05

## 2019-04-20 RX ADMIN — ASPIRIN 81 MG CHEWABLE TABLET 162 MG: 81 TABLET CHEWABLE at 09:05

## 2019-04-20 RX ADMIN — ACETAMINOPHEN 650 MG: 325 TABLET, FILM COATED ORAL at 09:05

## 2019-04-20 RX ADMIN — OXYCODONE AND ACETAMINOPHEN 1 TABLET: 10; 325 TABLET ORAL at 11:00

## 2019-04-20 RX ADMIN — PANTOPRAZOLE SODIUM 20 MG: 20 TABLET, DELAYED RELEASE ORAL at 07:00

## 2019-04-20 RX ADMIN — OXYCODONE AND ACETAMINOPHEN 1 TABLET: 10; 325 TABLET ORAL at 06:20

## 2019-04-20 NOTE — PROGRESS NOTES
POD 3    POSTOPERATIVE DIAGNOSES:  1. Left stenosis L3-S1 status post lumbar decompression, L2-L3-S1.  2.  Retrolisthesis, L2-L3. 3.  Spondylosis L3-S1.     PROCEDURE PERFORMED:  1. Lumbar decompression, left L3-S1 with decompression of the left L3, L4, L5 and S1 nerve roots with foraminotomy and partial fasciectomy. 2.  FloSpine pedicle instrumentation, L2-S1.  3.  Bilateral placement of iliac bolts. 4.  Bilateral posterolateral spinal fusion L2-S1.  5.  Placement of ASF NuVasive jumper romero supplemental fixation, L2-S1.       HPI:  Uneventful last 24 hrs  Post op pain controlled  No LEs numbness or weakness  No CP SOB  Tolerating diet  No abdominal pain; no BM; passing gas  Voiding well      ACTIVE MEDICAL PROBLEMS/PMH/PSH    Patient Active Problem List   Diagnosis Code    Acute transmural inferior wall MI ([address] I21.19    HTN (hypertension) I10    Lumbar stenosis M48.061       PMH:  Past Medical History:   Diagnosis Date    CAD (coronary artery disease)     Cortical age-related cataract of left eye 8/18/2017    Cortical age-related cataract of right eye 7/31/2017    GERD (gastroesophageal reflux disease)     Hypercholesteremia     Hypertension     Posterior capsular opacification visually significant, left eye 1/19/2019    Thyroid disease        PSH:  Past Surgical History:   Procedure Laterality Date    CARDIAC SURG PROCEDURE UNLIST      2 stents May 2014    HX CATARACT REMOVAL Right 08/01/2017    HX CATARACT REMOVAL Left     HX KNEE ARTHROSCOPY Left     HX ORTHOPAEDIC Right     knee replacement    HX SHOULDER ARTHROSCOPY Left     2/2016      ARTHROSCOPY KNEE 15 YEARS AGO    BACK SURGERY 2010   lower back    CORONARY STENT PLACEMENT   2 stents in OM with 50% LAD disease    ENDO, EGD WITH ESOPHAGEAL DILATATION   X2--LAST DILATATION 2005    KNEE REPLACEMENT JAN. 2009   RIGHT TKR    KNEE REPLACEMENT Left 1/4/2018   Procedure: ARTHROPLASTY KNEE REPLACEMENT; Surgeon: Jessi Joseph MD    SHOULDER REPAIR Right 2009     PTA MEDICATIONS  Medications Prior to Admission   Medication Sig    gabapentin (NEURONTIN) 300 mg capsule 300 mg three (3) times daily.  levothyroxine (SYNTHROID) 50 mcg tablet Take  by mouth Daily (before breakfast).  amLODIPine (NORVASC) 2.5 mg tablet Take 2.5 mg by mouth daily.  atenolol (TENORMIN) 25 mg tablet Take 1 Tab by mouth daily (with dinner). (Patient taking differently: Take 12.5 mg by mouth daily (with dinner). )    aspirin 81 mg chewable tablet Take 2 Tabs by mouth daily.  atorvastatin (LIPITOR) 40 mg tablet Take 1 Tab by mouth every evening.  omeprazole (PRILOSEC OTC) 20 mg tablet Take 20 mg by mouth Daily (before dinner).  multivitamin (ONE A DAY) tablet Take 1 Tab by mouth daily (with dinner).  nitroglycerin (NITROSTAT) 0.4 mg SL tablet 0.4 mg by SubLINGual route. ALLERGIES:  Not allergic to hydrocodone or  Oxycodone- decrease  Appetite  Discussed with patient agrees to take percocet     Allergies   Allergen Reactions    Hydrocodone Other (comments)     Weakness, GI issues    Oxycodone Other (comments)     GI intolerance and fatigue    Sulfa (Sulfonamide Antibiotics) Rash        Family History   Problem Relation Age of Onset    Heart Disease Mother        Social History     Socioeconomic History    Marital status:      Spouse name: Not on file    Number of children: Not on file    Years of education: Not on file    Highest education level: Not on file   Occupational History    Not on file   Social Needs    Financial resource strain: Not on file    Food insecurity:     Worry: Not on file     Inability: Not on file    Transportation needs:     Medical: Not on file     Non-medical: Not on file   Tobacco Use    Smoking status: Former Smoker     Last attempt to quit: 3/26/1994     Years since quittin.0    Smokeless tobacco: Never Used   Substance and Sexual Activity    Alcohol use:  Yes     Alcohol/week: 7.0 oz     Types: 15 Shots of liquor per week     Comment: socially    Drug use: No    Sexual activity: Not on file   Lifestyle    Physical activity:     Days per week: Not on file     Minutes per session: Not on file    Stress: Not on file   Relationships    Social connections:     Talks on phone: Not on file     Gets together: Not on file     Attends Nondenominational service: Not on file     Active member of club or organization: Not on file     Attends meetings of clubs or organizations: Not on file     Relationship status: Not on file    Intimate partner violence:     Fear of current or ex partner: Not on file     Emotionally abused: Not on file     Physically abused: Not on file     Forced sexual activity: Not on file   Other Topics Concern    Not on file   Social History Narrative    Not on file        ROS:  Constitutional:  . No headache. Cardiac: HPI  Respiratory: No cough  GI: HPI  : HPI  M/S: HPI  Neuro: HPI  Skin: No rash. No itching      Physical Exam:      Visit Vitals  /68 (BP 1 Location: Left arm, BP Patient Position: At rest;Supine)   Pulse 84   Temp 98.5 °F (36.9 °C)   Resp 17   Ht 5' 6\" (1.676 m)   Wt 70.8 kg (156 lb)   SpO2 95%   BMI 25.18 kg/m²     GENERAL: NAD  LUNGS: CTA. BS Normal Bilaterally  HEART: RRR    Significant murmur  ABDOMEN: Soft. Normal BS. No tenderness. LE: No edema. SCD in place  PULSES: Radial 2+ Posterior Tibialis 2+    NEUROLOGIC:     Motor  RLE: AT GS 5/5  LLE: At GS 5/5     CARDIAC CATHETERIZATION 06/03/14  IMPRESSION:  1. Coronaries:  Moderate non-obstructive calcific coronary artery   disease of major epicardial vessels, with distal vessel disease   of small inferior branch of obtuse marginal 1 and small inferior   branch of diagonal 2. 2/2 stent sites are widely patent  2. Left ventricle:  Normal function, ejection fraction 60%  3.  No mitral regurgitation or aortic stenosis     ECHO 05/02/18  NORMAL LEFT VENTRICULAR CAVITY SIZE AND SYSTOLIC FUNCTION WITH A CALCULATED EJECTION  FRACTION OF 55%. STAGE I DIASTOLIC DYSFUNCTION. MILD BASAL SEPTAL HYPERTROPHY WITH NO OUTFLOW TRACT OBSTRUCTION. LEFT ATRIAL DILATATION. THE RIGHT VENTRICLE IS NORMAL IN SIZE AND FUNCTION. NORMAL PULMONARY ARTERY PRESSURE OF 26 MMHG. NO HEMODYNAMICALLY SIGNIFICANT VALVULAR PATHOLOGY. Labs Reviewed:    No results found for this or any previous visit (from the past 24 hour(s)).   Imaging Reviewed:    ASSESSMENT  Post L/S surgery stable  Post op Pain controlled  HTN   CAD   Preserved LVEF  Mild diastolic dysfunction  Costipation    PLAN  Medically stable to D/C home  Resume PTA meds  MOM  F/U with Dr. Moo Tang and PMD      Gisela Harris MD  4/20/2019, 2:37 PM

## 2019-04-20 NOTE — ROUTINE PROCESS
Bedside and Verbal shift change report given to Marry Osorio (oncoming nurse) by Judit Parker (offgoing nurse). Report included the following information SBAR, Kardex, MAR and Recent Results.

## 2019-04-20 NOTE — PROGRESS NOTES
0755.Bedside and Verbal shift change report given to this nurse Damon Fairbanks (oncoming nurse) by Jodeane Kussmaul (offgoing nurse). Report included the following information SBAR, Kardex and MAR. 1050 - d/c instructions given, patient and spouse state understanding of instructions. 1115 - d/ off floor via wheelchair to go home.

## 2019-04-20 NOTE — PROGRESS NOTES
Pt dc'd home, no services ordered. Pt stated to me, when admitted,  he had a rolling walker at home. Care Management Interventions  PCP Verified by CM:  Yes  Last Visit to PCP: 04/04/19  Mode of Transport at Discharge: Self  Transition of Care Consult (CM Consult): Discharge Planning  Current Support Network: Lives with Spouse  Confirm Follow Up Transport: Self  Plan discussed with Pt/Family/Caregiver: Yes  Discharge Location  Discharge Placement: Other:(home vs home with home health) Home, no services

## 2019-04-20 NOTE — ANCILLARY DISCHARGE INSTRUCTIONS
Patient and/or next of kin has been given the [facility] Important Message From Medicare About Your Rights\" letter and all questions were answered. Paper copy signed.

## 2019-04-20 NOTE — DISCHARGE SUMMARY
Discharge Summary    Admit Date: 2019  Discharge Date:  2019     Patient ID:   Name:  Ambrocio Yang      Age:  77 y.o.    :  1950    Admitting Diagnosis: Lumbar stenosis [M48.061]     Post Operative Day: 3    Operative Procedures:  LAMINEC/FACETECT/FORAMIN,LUMBAR [71314] (SPINE LUMBAR POSTERIOR INTERBODY FUSION (PLIF))  LAMINEC/FACETECT/FORAMIN,EACH ADDNL [77778]  INSERT VERT FIX DEV,POST,3-6 SGMTS [06766]  [place] ARTHRODESIS POSTERIOR/POSTEROLATERAL LUMBAR [47045]  SPINE FUSN,POST TECH,EA ADDNL SGMT [53859]  INSERT PELVIC FIXATION DEVICE [60754]  [place] ALLOGRAFT FOR SPINE SURGERY ONLY STRUCTURAL [75198]      Isolation Precautions:   Not required. Patient is not currently contagious. Physical Exam on Discharge:  Visit Vitals  /68 (BP 1 Location: Left arm, BP Patient Position: At rest;Supine)   Pulse 84   Temp 98.5 °F (36.9 °C)   Resp 17   Ht 5' 6\" (1.676 m)   Wt 70.8 kg (156 lb)   SpO2 95%   BMI 25.18 kg/m²         General: in no apparent distress   Extremities:  Neurovascular intact    Dressing:  Dry   DVT Exam:   No evidence of DVT seen on physical exam;  compartments soft and NT.          Relevant labs within last 72 hours:    CBC w/Diff    Lab Results   Component Value Date/Time    WBC 5.9 2019 10:15 AM    RBC 4.49 (L) 2019 10:15 AM    HCT 42.0 2019 10:15 AM    MCV 93.5 2019 10:15 AM    MCH 31.4 2019 10:15 AM    MCHC 33.6 2019 10:15 AM    RDW 13.1 2019 10:15 AM    Lab Results   Component Value Date/Time    MONOS 9 2014 06:49 PM    EOS 4 2014 06:49 PM    BASOS 0 2014 06:49 PM    RDW 13.1 2019 10:15 AM          BMP   Lab Results   Component Value Date     2019    CO2 28 2019    BUN 20 (H) 2019          Coagulation   No results found for: INR, APTT           Condition at discharge: Afebrile  Ambulating  Eating, Drinking, Voiding  Stable    Current Discharge Medication List      CONTINUE these medications which have NOT CHANGED    Details   gabapentin (NEURONTIN) 300 mg capsule 300 mg three (3) times daily. levothyroxine (SYNTHROID) 50 mcg tablet Take  by mouth Daily (before breakfast). amLODIPine (NORVASC) 2.5 mg tablet Take 2.5 mg by mouth daily. atenolol (TENORMIN) 25 mg tablet Take 1 Tab by mouth daily (with dinner). Qty: 60 Tab, Refills: 1      aspirin 81 mg chewable tablet Take 2 Tabs by mouth daily. atorvastatin (LIPITOR) 40 mg tablet Take 1 Tab by mouth every evening. Qty: 30 Tab, Refills: 1      omeprazole (PRILOSEC OTC) 20 mg tablet Take 20 mg by mouth Daily (before dinner). multivitamin (ONE A DAY) tablet Take 1 Tab by mouth daily (with dinner). nitroglycerin (NITROSTAT) 0.4 mg SL tablet 0.4 mg by SubLINGual route. PCP:  Sam Roa NP        Disposition:  Clear for discharge to home, if clear by medicine service. Follow-up in the office in  1 month with Dr. Sonja Huffman; call [phone] to schedule appointment.      Wound Care: open to air POD 5         -Dilcia Locke PA-C  4/20/2019  Office [phone]  Cell [phone]

## 2021-04-21 PROBLEM — I71.40 AAA (ABDOMINAL AORTIC ANEURYSM): Status: ACTIVE | Noted: 2021-04-21

## 2021-07-12 PROBLEM — K56.609 SMALL BOWEL OBSTRUCTION (HCC): Status: ACTIVE | Noted: 2021-07-12

## 2021-07-12 PROBLEM — Z98.890 S/P AAA (ABDOMINAL AORTIC ANEURYSM) REPAIR: Status: ACTIVE | Noted: 2021-07-12

## 2021-07-12 PROBLEM — R11.2 NAUSEA WITH VOMITING: Status: ACTIVE | Noted: 2021-07-12

## 2021-07-12 PROBLEM — K59.01 SLOW TRANSIT CONSTIPATION: Status: ACTIVE | Noted: 2021-07-12

## 2021-07-12 PROBLEM — Z86.79 S/P AAA (ABDOMINAL AORTIC ANEURYSM) REPAIR: Status: ACTIVE | Noted: 2021-07-12

## 2021-07-12 PROBLEM — N17.9 AKI (ACUTE KIDNEY INJURY) (HCC): Status: ACTIVE | Noted: 2021-07-12

## 2021-07-13 PROBLEM — E43 SEVERE PROTEIN-CALORIE MALNUTRITION (HCC): Chronic | Status: ACTIVE | Noted: 2021-07-13

## 2021-12-07 PROBLEM — K43.2 INCISIONAL HERNIA: Status: ACTIVE | Noted: 2021-12-07

## 2022-02-01 PROBLEM — R74.8 ELEVATED LIVER ENZYMES: Status: ACTIVE | Noted: 2022-02-01

## 2022-02-01 PROBLEM — K80.51 CHOLEDOCHOLITHIASIS WITH OBSTRUCTION: Status: ACTIVE | Noted: 2022-02-01

## 2022-02-01 PROBLEM — E86.0 DEHYDRATION: Status: ACTIVE | Noted: 2022-02-01

## 2022-02-01 PROBLEM — R17 JAUNDICE: Status: ACTIVE | Noted: 2022-02-01

## 2022-02-01 PROBLEM — R10.13 EPIGASTRIC ABDOMINAL PAIN: Status: ACTIVE | Noted: 2022-02-01

## (undated) DEVICE — PREP SKN DURAPREP 26ML APPL --

## (undated) DEVICE — KENDALL SCD EXPRESS SLEEVES, KNEE LENGTH, MEDIUM: Brand: KENDALL SCD

## (undated) DEVICE — INTENDED FOR TISSUE SEPARATION, AND OTHER PROCEDURES THAT REQUIRE A SHARP SURGICAL BLADE TO PUNCTURE OR CUT.: Brand: BARD-PARKER ® CARBON RIB-BACK BLADES

## (undated) DEVICE — 3M™ IOBAN™ 2 ANTIMICROBIAL INCISE DRAPE 6650EZ: Brand: IOBAN™ 2

## (undated) DEVICE — PAD,ABDOMINAL,5"X9",STERILE,LF,1/PK: Brand: MEDLINE INDUSTRIES, INC.

## (undated) DEVICE — SUTURE ABSORBABLE BRAIDED 2-0 CT-1 27 IN UD VICRYL J259H

## (undated) DEVICE — PACKING 8004051 NEURAY 200PK 13X152MM: Brand: NEURAY ®

## (undated) DEVICE — SYRINGE BLB 50CC IRRIG PLIABLE FNGR FLNG GRAD FLSK DISP

## (undated) DEVICE — PACKING 8004050 NEURAY 200PK 7X152MM: Brand: NEURAY ®

## (undated) DEVICE — REM POLYHESIVE ADULT PATIENT RETURN ELECTRODE: Brand: VALLEYLAB

## (undated) DEVICE — BIPOLAR FORCEPS CORD,BANANA LEADS: Brand: VALLEYLAB

## (undated) DEVICE — SOL IRRIGATION INJ NACL 0.9% 500ML BTL

## (undated) DEVICE — BIPOLAR FORCEPS CORD: Brand: VALLEYLAB

## (undated) DEVICE — GAUZE,SPONGE,8"X4",12PLY,XRAY,STRL,LF: Brand: MEDLINE

## (undated) DEVICE — ART BMC PROCESSING KIT

## (undated) DEVICE — SPONGE GZ W4XL4IN COT 12 PLY TYP VII WVN C FLD DSGN

## (undated) DEVICE — THE MILL DISPOSABLE - FINE

## (undated) DEVICE — SUTURE COAT VCRL SZ 0 L18IN ABSRB UD CTX L48MM 1/2 CIR J724D

## (undated) DEVICE — TOWEL SURG W16XL26IN BLU NONFENESTRATED DLX ST 2 PER PK

## (undated) DEVICE — SYR LR LCK 1ML GRAD NSAF 30ML --

## (undated) DEVICE — SHEET,DRAPE,70X100,STERILE: Brand: MEDLINE

## (undated) DEVICE — SPINE PACK DEPAUL: Brand: MEDLINE INDUSTRIES, INC.

## (undated) DEVICE — MAYO STAND COVER: Brand: CONVERTORS

## (undated) DEVICE — TOOL 14MH30 LEGEND 14CM 3MM: Brand: MIDAS REX ™

## (undated) DEVICE — STERILE LATEX POWDER-FREE SURGICAL GLOVESWITH NITRILE COATING: Brand: PROTEXIS

## (undated) DEVICE — BRUSH SCRB PCMX NL CLN 12ML --

## (undated) DEVICE — TRAY CATH 16F URIN MTR LTX -- CONVERT TO ITEM 363111

## (undated) DEVICE — ASPIRATION KIT

## (undated) DEVICE — X-RAY SPONGES,12 PLY: Brand: DERMACEA

## (undated) DEVICE — SUTURE COAT VCRL PC 5 PRECIS COSM CONVENTIONAL CUT PRIM 3 8 J823H

## (undated) DEVICE — CODMAN® SURGICAL STRIPS 1/2" X 6" (13MM X 152MM): Brand: CODMAN®

## (undated) DEVICE — CATH URETH FOL 2W SH 14FRX5ML -- CONVERT TO ITEM 363071

## (undated) DEVICE — 12FR FRAZIER SUCTION HANDLE: Brand: CARDINAL HEALTH

## (undated) DEVICE — SUTURE VCRL SZ 0 L27IN ABSRB UD L36MM CT-1 1/2 CIR J260H

## (undated) DEVICE — NDL PRT INJ NSAF BLNT 18GX1.5 --

## (undated) DEVICE — CODMAN® SURGICAL STRIPS 1/4" X 6" (6MM X 152MM): Brand: CODMAN®

## (undated) DEVICE — PAD PREP ALCOHOL LG STERILE -- CONVERT TO ITEM 305014

## (undated) DEVICE — FLOSEAL HEMOSTATIC MATRIX, 10 ML: Brand: FLOSEAL

## (undated) DEVICE — SUTURE VCRL SZ 0 L36IN ABSRB UD L48MM CTX 1/2 CIR J978H

## (undated) DEVICE — ELECTRODE BLDE L4IN NONINSULATED EDGE